# Patient Record
Sex: MALE | Race: WHITE | NOT HISPANIC OR LATINO | Employment: FULL TIME | URBAN - METROPOLITAN AREA
[De-identification: names, ages, dates, MRNs, and addresses within clinical notes are randomized per-mention and may not be internally consistent; named-entity substitution may affect disease eponyms.]

---

## 2020-02-25 ENCOUNTER — HOSPITAL ENCOUNTER (EMERGENCY)
Facility: HOSPITAL | Age: 30
Discharge: HOME | End: 2020-02-25
Attending: EMERGENCY MEDICINE | Admitting: EMERGENCY MEDICINE

## 2020-02-25 VITALS
OXYGEN SATURATION: 96 % | TEMPERATURE: 97.8 F | HEART RATE: 91 BPM | RESPIRATION RATE: 12 BRPM | SYSTOLIC BLOOD PRESSURE: 145 MMHG | WEIGHT: 158 LBS | HEIGHT: 71 IN | DIASTOLIC BLOOD PRESSURE: 88 MMHG | BODY MASS INDEX: 22.12 KG/M2

## 2020-02-25 DIAGNOSIS — S39.012A STRAIN OF LUMBAR REGION, INITIAL ENCOUNTER: Primary | ICD-10-CM

## 2020-02-25 DIAGNOSIS — M62.830 LUMBAR PARASPINAL MUSCLE SPASM: ICD-10-CM

## 2020-02-25 PROCEDURE — 25010000002 KETOROLAC TROMETHAMINE PER 15 MG: Performed by: PHYSICIAN ASSISTANT

## 2020-02-25 PROCEDURE — 96372 THER/PROPH/DIAG INJ SC/IM: CPT

## 2020-02-25 PROCEDURE — 99282 EMERGENCY DEPT VISIT SF MDM: CPT

## 2020-02-25 PROCEDURE — 99282 EMERGENCY DEPT VISIT SF MDM: CPT | Performed by: PHYSICIAN ASSISTANT

## 2020-02-25 RX ORDER — KETOROLAC TROMETHAMINE 30 MG/ML
60 INJECTION, SOLUTION INTRAMUSCULAR; INTRAVENOUS ONCE
Status: COMPLETED | OUTPATIENT
Start: 2020-02-25 | End: 2020-02-25

## 2020-02-25 RX ORDER — IBUPROFEN 400 MG/1
400 TABLET ORAL EVERY 6 HOURS PRN
COMMUNITY
End: 2022-07-18

## 2020-02-25 RX ORDER — METHOCARBAMOL 750 MG/1
750 TABLET, FILM COATED ORAL 3 TIMES DAILY PRN
Qty: 21 TABLET | Refills: 0 | Status: SHIPPED | OUTPATIENT
Start: 2020-02-25 | End: 2022-07-12

## 2020-02-25 RX ADMIN — KETOROLAC TROMETHAMINE 60 MG: 30 INJECTION, SOLUTION INTRAMUSCULAR; INTRAVENOUS at 15:06

## 2020-02-25 NOTE — ED PROVIDER NOTES
EMERGENCY DEPARTMENT ENCOUNTER      Room Number: 2/02    History is provided by the patient, no translation services needed    HPI:    Chief complaint: Back pain    Location: Right lumbar region    Quality/Severity: 7/10, tightness    Timing/Duration: Patient was lifting boxes of hardwood mango yesterday when his pain began.    Modifying Factors: Patient states he took an ibuprofen last night and this morning, he states his pain does improve with ibuprofen.  Has not applied any ice or heat to the area.    Associated Symptoms: Positive for back pain.  Patient denies any pain radiating down his leg, saddle anesthesia, loss of bowel or bladder control, history of IV drug use, or fevers or chills.    Narrative: Pt is a 30 y.o. male who presents complaining of right lower back pain since yesterday.  Patient states he was helping his dad insert mango, he was repeatedly lifting boxes of hardwood mango when he went to lift 1 and felt a sudden pain in his right lower back.  He states the pain radiates up to his thoracic region but denies any pain radiating down his leg.  He states the pain is worse with movement, and ibuprofen has helped.       PMD: Provider, No Known    REVIEW OF SYSTEMS  Review of Systems   Constitutional: Negative for chills and fever.   Respiratory: Negative for cough and shortness of breath.    Cardiovascular: Negative for chest pain and palpitations.   Gastrointestinal: Negative for abdominal pain, nausea and vomiting.   Genitourinary: Negative for difficulty urinating, dysuria, flank pain and hematuria.   Musculoskeletal: Positive for back pain. Negative for neck pain.   Skin: Negative for rash and wound.   Neurological: Negative for dizziness and syncope.   Psychiatric/Behavioral: Negative for confusion. The patient is not nervous/anxious.          PAST MEDICAL HISTORY  Active Ambulatory Problems     Diagnosis Date Noted   • No Active Ambulatory Problems     Resolved Ambulatory Problems      Diagnosis Date Noted   • No Resolved Ambulatory Problems     No Additional Past Medical History       PAST SURGICAL HISTORY  History reviewed. No pertinent surgical history.    FAMILY HISTORY  History reviewed. No pertinent family history.    SOCIAL HISTORY  Social History     Socioeconomic History   • Marital status: Single     Spouse name: Not on file   • Number of children: Not on file   • Years of education: Not on file   • Highest education level: Not on file   Tobacco Use   • Smoking status: Current Every Day Smoker     Packs/day: 0.50     Types: Cigarettes   Substance and Sexual Activity   • Alcohol use: Never     Frequency: Never       ALLERGIES  Patient has no known allergies.    No current facility-administered medications for this encounter.     Current Outpatient Medications:   •  ibuprofen (ADVIL,MOTRIN) 400 MG tablet, Take 400 mg by mouth Every 6 (Six) Hours As Needed for Mild Pain ., Disp: , Rfl:   •  methocarbamol (ROBAXIN) 750 MG tablet, Take 1 tablet by mouth 3 (Three) Times a Day As Needed for Muscle Spasms., Disp: 21 tablet, Rfl: 0    PHYSICAL EXAM  ED Triage Vitals   Temp Pulse Resp BP SpO2   -- -- -- -- --      Temp src Heart Rate Source Patient Position BP Location FiO2 (%)   -- -- -- -- --       Physical Exam   Constitutional: He is oriented to person, place, and time and well-developed, well-nourished, and in no distress.   HENT:   Head: Normocephalic and atraumatic.   Mouth/Throat: Oropharynx is clear and moist.   Neck: Normal range of motion. Neck supple.   Cardiovascular: Normal rate, regular rhythm and intact distal pulses.   Pulmonary/Chest: Effort normal. No respiratory distress.   Abdominal: There is no tenderness. There is no guarding.   Musculoskeletal:        Lumbar back: He exhibits tenderness (Right paraspinal muscle) and spasm. He exhibits no bony tenderness.   Negative straight leg raise.  Normal sensation, distal pulses equal and intact bilaterally.   Neurological: He is alert  and oriented to person, place, and time. Gait normal. GCS score is 15.   Skin: Skin is warm and dry.   Psychiatric: Mood, memory, affect and judgment normal.         LAB RESULTS  No results found for this or any previous visit.      I ordered the above labs and reviewed the results    RADIOLOGY  No results found.    I ordered the above radiologic testing and reviewed the results    PROCEDURES  Procedures      PROGRESS AND CONSULTS  ED Course as of Feb 25 1525   Tue Feb 25, 2020   1517 Patient presented with right-sided lumbar tenderness and spasm.  I discussed that he has likely strained a muscle in his lower back, he has no tenderness along his L-spine, pain is nonradiating.  He was given 60 mg of Toradol here, and a prescription for Robaxin.  I discussed that he may continue taking NSAIDs along with the Robaxin, and alternate ice and heat.  He has also been given instructions on proper lifting, and has been instructed to follow-up with a primary care doctor should he not improve.  I discussed if he has any worsening or emergent symptoms to return to the ED.  Patient verbalizes understanding and is agreeable with this plan.    [KS]      ED Course User Index  [KS] Tiara Peterson, NADYA           MEDICAL DECISION MAKING  Results were reviewed/discussed with the patient and they were also made aware of online access. Pt also made aware that some labs, such as cultures, will not be resulted during ER visit and follow up with PMD is necessary.     MDM        My differential diagnosis for back pain includes but is not limited to:  Musculoskeletal strain, contusion, retroperitoneal hematoma, disc protrusion, vertebral fracture, transverse process fracture, rib fracture, facet syndrome, sacroiliac joint strain, sciatica, renal injury, splenic injury, pancreatic injury, osteoarthritis, lumbar spondylosis, spinal stenosis, ankylosing spondylitis, sacroiliac joint inflammation, pancreatitis, perforated peptic ulcer,  diverticulitis, endometriosis, chronic PID, epidural abscess, osteomyelitis, retroperitoneal abscess, pyelonephritis, pneumonia, subphrenic abscess, tuberculosis, neurofibroma, meningioma, multiple myeloma, lymphoma, metastatic cancer, primary cancer, AAA, aortic dissection, spinal ischemia, referred pain, ureterolithiasis      DIAGNOSIS  Final diagnoses:   Strain of lumbar region, initial encounter   Lumbar paraspinal muscle spasm       Latest Documented Vital Signs:  As of 3:25 PM  BP- 145/88 HR- 91 Temp- 97.8 °F (36.6 °C) (Oral) O2 sat- 96%    DISPOSITION  Patient discharged home with instructions to follow-up with a PCP if not improved.    Patient/Family voiced understanding of need to follow-up for recheck, further testing as needed.  Return to the emergency Department warnings were given.         Medication List      New Prescriptions    methocarbamol 750 MG tablet  Commonly known as:  ROBAXIN  Take 1 tablet by mouth 3 (Three) Times a Day As Needed for Muscle Spasms.              Follow-up Information     Provider, No Known. Call in 1 week.    Why:  If not improved  Contact information:  [place] [id number] [phone]                     Dictated utilizing Dragon dictation       Tiara Peterson PA-C  02/25/20 1525       Tiara Peterson PA-C  02/25/20 1522

## 2020-02-25 NOTE — DISCHARGE INSTRUCTIONS
Return to the emergency department with worsening symptoms, or as needed with emergent concerns.    Continue taking Motrin 600 mg to 800 mg every 8 hours as needed for pain.

## 2021-11-29 ENCOUNTER — HOSPITAL ENCOUNTER (EMERGENCY)
Facility: HOSPITAL | Age: 31
Discharge: HOME | End: 2021-11-29
Attending: EMERGENCY MEDICINE | Admitting: EMERGENCY MEDICINE

## 2021-11-29 ENCOUNTER — APPOINTMENT (OUTPATIENT)
Dept: GENERAL RADIOLOGY | Facility: HOSPITAL | Age: 31
End: 2021-11-29

## 2021-11-29 VITALS
BODY MASS INDEX: 24.92 KG/M2 | RESPIRATION RATE: 16 BRPM | HEART RATE: 75 BPM | TEMPERATURE: 98.8 F | WEIGHT: 178 LBS | OXYGEN SATURATION: 98 % | SYSTOLIC BLOOD PRESSURE: 151 MMHG | HEIGHT: 71 IN | DIASTOLIC BLOOD PRESSURE: 85 MMHG

## 2021-11-29 DIAGNOSIS — S69.92XA FINGER INJURY, LEFT, INITIAL ENCOUNTER: Primary | ICD-10-CM

## 2021-11-29 PROCEDURE — 99283 EMERGENCY DEPT VISIT LOW MDM: CPT | Performed by: PHYSICIAN ASSISTANT

## 2021-11-29 PROCEDURE — 99282 EMERGENCY DEPT VISIT SF MDM: CPT

## 2021-11-29 PROCEDURE — 99283 EMERGENCY DEPT VISIT LOW MDM: CPT

## 2021-11-29 PROCEDURE — 73140 X-RAY EXAM OF FINGER(S): CPT

## 2021-11-29 RX ORDER — CEFADROXIL 500 MG/1
500 CAPSULE ORAL 2 TIMES DAILY
Qty: 10 CAPSULE | Refills: 0 | Status: SHIPPED | OUTPATIENT
Start: 2021-11-29 | End: 2021-12-04

## 2022-01-28 ENCOUNTER — HOSPITAL ENCOUNTER (EMERGENCY)
Facility: HOSPITAL | Age: 32
Discharge: HOME | End: 2022-01-28
Attending: EMERGENCY MEDICINE | Admitting: EMERGENCY MEDICINE

## 2022-01-28 VITALS
BODY MASS INDEX: 24.5 KG/M2 | TEMPERATURE: 98.1 F | HEART RATE: 88 BPM | RESPIRATION RATE: 18 BRPM | SYSTOLIC BLOOD PRESSURE: 125 MMHG | WEIGHT: 175 LBS | OXYGEN SATURATION: 98 % | DIASTOLIC BLOOD PRESSURE: 77 MMHG | HEIGHT: 71 IN

## 2022-01-28 DIAGNOSIS — L02.416 ABSCESS OF LEFT KNEE: Primary | ICD-10-CM

## 2022-01-28 DIAGNOSIS — L03.116 CELLULITIS OF LEFT LOWER EXTREMITY: ICD-10-CM

## 2022-01-28 PROCEDURE — 87186 SC STD MICRODIL/AGAR DIL: CPT | Performed by: NURSE PRACTITIONER

## 2022-01-28 PROCEDURE — 87070 CULTURE OTHR SPECIMN AEROBIC: CPT | Performed by: NURSE PRACTITIONER

## 2022-01-28 PROCEDURE — 99283 EMERGENCY DEPT VISIT LOW MDM: CPT

## 2022-01-28 PROCEDURE — 87147 CULTURE TYPE IMMUNOLOGIC: CPT | Performed by: NURSE PRACTITIONER

## 2022-01-28 PROCEDURE — 87205 SMEAR GRAM STAIN: CPT | Performed by: NURSE PRACTITIONER

## 2022-01-28 PROCEDURE — 10060 I&D ABSCESS SIMPLE/SINGLE: CPT | Performed by: NURSE PRACTITIONER

## 2022-01-28 RX ORDER — LIDOCAINE HYDROCHLORIDE 20 MG/ML
10 INJECTION, SOLUTION INFILTRATION; PERINEURAL ONCE
Status: COMPLETED | OUTPATIENT
Start: 2022-01-28 | End: 2022-01-28

## 2022-01-28 RX ORDER — BACITRACIN ZINC 500 [USP'U]/G
1 OINTMENT TOPICAL ONCE
Status: COMPLETED | OUTPATIENT
Start: 2022-01-28 | End: 2022-01-28

## 2022-01-28 RX ORDER — SULFAMETHOXAZOLE AND TRIMETHOPRIM 800; 160 MG/1; MG/1
1 TABLET ORAL 2 TIMES DAILY
Qty: 14 TABLET | Refills: 0 | Status: SHIPPED | OUTPATIENT
Start: 2022-01-28 | End: 2022-02-04

## 2022-01-28 RX ORDER — CEPHALEXIN 500 MG/1
500 CAPSULE ORAL 4 TIMES DAILY
Qty: 28 CAPSULE | Refills: 0 | Status: SHIPPED | OUTPATIENT
Start: 2022-01-28 | End: 2022-02-04

## 2022-01-28 RX ORDER — LIDOCAINE HYDROCHLORIDE AND EPINEPHRINE 10; 10 MG/ML; UG/ML
10 INJECTION, SOLUTION INFILTRATION; PERINEURAL ONCE
Status: DISCONTINUED | OUTPATIENT
Start: 2022-01-28 | End: 2022-01-28

## 2022-01-28 RX ADMIN — LIDOCAINE HYDROCHLORIDE 10 ML: 20 INJECTION, SOLUTION INFILTRATION; PERINEURAL at 12:57

## 2022-01-28 RX ADMIN — BACITRACIN ZINC 1 APPLICATION: 500 OINTMENT TOPICAL at 13:12

## 2022-01-30 LAB
BACTERIA SPEC AEROBE CULT: ABNORMAL
GRAM STN SPEC: ABNORMAL
GRAM STN SPEC: ABNORMAL

## 2022-07-06 ENCOUNTER — HOSPITAL ENCOUNTER (EMERGENCY)
Facility: HOSPITAL | Age: 32
Discharge: HOME | End: 2022-07-06
Attending: EMERGENCY MEDICINE | Admitting: EMERGENCY MEDICINE

## 2022-07-06 VITALS
WEIGHT: 172 LBS | OXYGEN SATURATION: 97 % | TEMPERATURE: 98.2 F | SYSTOLIC BLOOD PRESSURE: 139 MMHG | HEART RATE: 84 BPM | DIASTOLIC BLOOD PRESSURE: 83 MMHG | BODY MASS INDEX: 24.08 KG/M2 | RESPIRATION RATE: 16 BRPM | HEIGHT: 71 IN

## 2022-07-06 DIAGNOSIS — M70.41 PREPATELLAR BURSITIS OF RIGHT KNEE: Primary | ICD-10-CM

## 2022-07-06 PROCEDURE — 99283 EMERGENCY DEPT VISIT LOW MDM: CPT

## 2022-07-06 RX ORDER — SULFAMETHOXAZOLE AND TRIMETHOPRIM 800; 160 MG/1; MG/1
1 TABLET ORAL 2 TIMES DAILY
Qty: 14 TABLET | Refills: 0 | Status: SHIPPED | OUTPATIENT
Start: 2022-07-06 | End: 2022-07-12 | Stop reason: SDUPTHER

## 2022-07-06 RX ORDER — CEPHALEXIN 500 MG/1
500 CAPSULE ORAL ONCE
Status: COMPLETED | OUTPATIENT
Start: 2022-07-06 | End: 2022-07-06

## 2022-07-06 RX ORDER — SULFAMETHOXAZOLE AND TRIMETHOPRIM 800; 160 MG/1; MG/1
1 TABLET ORAL ONCE
Status: COMPLETED | OUTPATIENT
Start: 2022-07-06 | End: 2022-07-06

## 2022-07-06 RX ORDER — CEPHALEXIN 500 MG/1
500 CAPSULE ORAL 4 TIMES DAILY
Qty: 28 CAPSULE | Refills: 0 | Status: SHIPPED | OUTPATIENT
Start: 2022-07-06 | End: 2022-07-12 | Stop reason: SDUPTHER

## 2022-07-06 RX ADMIN — CEPHALEXIN 500 MG: 500 CAPSULE ORAL at 00:50

## 2022-07-06 RX ADMIN — SULFAMETHOXAZOLE AND TRIMETHOPRIM 1 TABLET: 800; 160 TABLET ORAL at 00:50

## 2022-07-06 NOTE — ED PROVIDER NOTES
EMERGENCY DEPARTMENT ENCOUNTER    Room Number:  08/08  Date seen:  7/6/2022  PCP: Provider, No Known  Historian: Patient      HPI:  Chief Complaint: Right knee swelling, tenderness  A complete HPI/ROS/PMH/PSH/SH/FH are unobtainable due to: N/A  Context: Jonny Arellano is a 32 y.o. male who presents to the ED c/o increasing swelling and tenderness in the right knee.  Patient says that 3 weeks ago he was evaluated at an urgent care center for an injury to his right knee.  Over the 3 weeks time, he has had some intermittent swelling and tenderness but recently over the past week it seems to be more swollen and now some redness has developed over the knee and slightly spreading into the lower leg as well.  Patient denies any fevers.  He works for a m[employer] company and he his usual job activities require him to frequently put pressure on his knees.            PAST MEDICAL HISTORY  Active Ambulatory Problems     Diagnosis Date Noted   • No Active Ambulatory Problems     Resolved Ambulatory Problems     Diagnosis Date Noted   • No Resolved Ambulatory Problems     No Additional Past Medical History         PAST SURGICAL HISTORY  History reviewed. No pertinent surgical history.      FAMILY HISTORY  History reviewed. No pertinent family history.      SOCIAL HISTORY  Social History     Socioeconomic History   • Marital status: Single   Tobacco Use   • Smoking status: Current Every Day Smoker     Packs/day: 0.50     Types: Cigarettes   Substance and Sexual Activity   • Alcohol use: Never         ALLERGIES  Patient has no known allergies.        REVIEW OF SYSTEMS  Review of Systems   Constitutional: Negative for activity change and fever.   HENT: Negative.    Respiratory: Negative for cough and shortness of breath.    Cardiovascular: Negative for chest pain.   Gastrointestinal: Negative for abdominal pain and vomiting.   Musculoskeletal: Positive for arthralgias and joint swelling. Negative for gait problem.   Skin: Negative  for pallor and wound.   Neurological: Negative for syncope and headaches.   All other systems reviewed and are negative.           PHYSICAL EXAM  ED Triage Vitals [07/06/22 0025]   Temp Heart Rate Resp BP SpO2   98.2 °F (36.8 °C) 84 16 139/83 97 %      Temp src Heart Rate Source Patient Position BP Location FiO2 (%)   Oral Monitor Sitting Right arm --         GENERAL: Pleasant young man, normal habitus, calm and no acute distress  HENT: nares patent, normocephalic and atraumatic  EYES: no scleral icterus, EOMI  CV: regular rhythm, normal rate, normal distal pulses at the bilateral lower legs  RESPIRATORY: normal effort, no stridor  MUSCULOSKELETAL: The right knee shows features that are common for prepatellar bursitis with some soft tissue swelling anterior to the knee that is mildly tender and erythematous.  There is a very small area of erythema extending beyond the anterior knee and towards the proximal tibia soft tissues with slight induration.  The knee is warm to touch but not hot to touch.  Patient is able to flex and extend the knee appropriately.  He is able to bear weight and ambulate on the knee unassisted without any significant sign of discomfort.  NEURO: alert, moves all extremities, follows commands, no deficits  PSYCH:  calm, cooperative  SKIN: warm, dry    Vital signs and nursing notes reviewed.          LAB RESULTS  No results found for this or any previous visit (from the past 24 hour(s)).    Ordered the above labs and reviewed the results.        RADIOLOGY  No Radiology Exams Resulted Within Past 24 Hours    Ordered the above noted radiological studies. Reviewed by me in PACS.            PROCEDURES  Procedures              MEDICATIONS GIVEN IN ER  Medications   cephalexin (KEFLEX) capsule 500 mg (500 mg Oral Given 7/6/22 0050)   sulfamethoxazole-trimethoprim (BACTRIM DS,SEPTRA DS) 800-160 MG per tablet 1 tablet (1 tablet Oral Given 7/6/22 0050)                   MEDICAL DECISION MAKING, PROGRESS,  "and CONSULTS    All labs have been independently reviewed by me.  All radiology studies have been reviewed by me and discussed with radiologist dictating the report.   EKG's independently viewed and interpreted by me.  Discussion below represents my analysis of pertinent findings related to patient's condition, differential diagnosis, treatment plan and final disposition.          ED Course as of 07/06/22 0520 Wed Jul 06, 2022 0519 Patient presented with some nonspecific swelling and redness of the right knee.  History and examination were strongly suggestive of prepatellar bursitis especially since the patient is in the [employer] business.  I did not have any clinical concerns for a septic arthritis based on the appearance of the knee at this time.  I do worry that it had some early surrounding cellulitic changes and therefore I put him on dual antibiotic therapy with Keflex and Bactrim.  I encouraged him to follow-up promptly in the orthopedics office this week for further evaluation and management as needed.  Gave him resource information to do so.  Discussed with him the usual \"return to ER\" instructions for any worsening signs or symptoms prior to discharge as well. [TAMARA]      ED Course User Index  [TAMARA] Andrea Myers MD                  DIAGNOSIS  Final diagnoses:   Prepatellar bursitis of right knee         DISPOSITION  Home            Latest Documented Vital Signs:  As of 05:20 EDT  BP- 139/83 HR- 84 Temp- 98.2 °F (36.8 °C) (Oral) O2 sat- 97%        --    Please note that portions of this were completed with a voice recognition program.          Andrea Myers MD  07/06/22 0520    "

## 2022-07-06 NOTE — ED NOTES
PT presents to the ED with c/o left knee pain. Per pt he had an injury 3 weeks ago and eas seen at urgent care. Pt reports the swelling has went down but over the past week his knee has became increasingly red and warm to the touch. VSS.

## 2022-07-06 NOTE — DISCHARGE INSTRUCTIONS
Take antibiotics as directed.  Must call the orthopedics office in the morning to schedule an appointment for follow-up evaluation this week.  Please return to the emergency room for any worsening pain, swelling, redness, fevers, weakness or any other concerns.

## 2022-07-08 ENCOUNTER — PATIENT ROUNDING (BHMG ONLY) (OUTPATIENT)
Dept: ORTHOPEDIC SURGERY | Facility: CLINIC | Age: 32
End: 2022-07-08

## 2022-07-08 ENCOUNTER — OFFICE VISIT (OUTPATIENT)
Dept: ORTHOPEDIC SURGERY | Facility: CLINIC | Age: 32
End: 2022-07-08

## 2022-07-08 VITALS
HEIGHT: 71 IN | BODY MASS INDEX: 24.08 KG/M2 | WEIGHT: 172 LBS | DIASTOLIC BLOOD PRESSURE: 80 MMHG | HEART RATE: 83 BPM | SYSTOLIC BLOOD PRESSURE: 116 MMHG

## 2022-07-08 DIAGNOSIS — M71.161 INFECTION OF RIGHT PREPATELLAR BURSA: ICD-10-CM

## 2022-07-08 DIAGNOSIS — M25.561 ACUTE PAIN OF RIGHT KNEE: Primary | ICD-10-CM

## 2022-07-08 PROCEDURE — 99203 OFFICE O/P NEW LOW 30 MIN: CPT | Performed by: ORTHOPAEDIC SURGERY

## 2022-07-08 PROCEDURE — 73562 X-RAY EXAM OF KNEE 3: CPT | Performed by: ORTHOPAEDIC SURGERY

## 2022-07-08 RX ORDER — OMEPRAZOLE 20 MG/1
20 CAPSULE, DELAYED RELEASE ORAL DAILY
COMMUNITY

## 2022-07-08 NOTE — PROGRESS NOTES
Subjective: Infected right prepatellar bursa     Patient ID: Jonny Arellano is a 32 y.o. male.    Chief Complaint:    History of Present Illness 32-year-old male presents evaluation of his right knee which been symptomatic for several weeks no history of trauma but is noted some swelling over his right knee.  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  Developed some swelling first went to urgent care and they x-rayed him no abnormality placed him on an anti-inflammatory discharge but he later went to urgent care because he developing some swelling and erythema about the knee and they placed him on antibiotics Keflex and Bactrim.  Initially states in the past week he had significant swelling and erythema and even peeling of the skin and states it is better since his been on the antibiotic for the past 3 days.  Still having pain as expected       Social History     Occupational History   • Not on file   Tobacco Use   • Smoking status: Current Every Day Smoker     Packs/day: 0.50     Years: 8.00     Pack years: 4.00     Types: Cigarettes   • Smokeless tobacco: Never Used   Vaping Use   • Vaping Use: Never used   Substance and Sexual Activity   • Alcohol use: Never   • Drug use: Defer   • Sexual activity: Yes      Review of Systems      History reviewed. No pertinent past medical history.  History reviewed. No pertinent surgical history.  History reviewed. No pertinent family history.      Objective:  Vitals:    07/08/22 0847   BP: 116/80   Pulse: 83         07/08/22  0847   Weight: 78 kg (172 lb)     Body mass index is 23.99 kg/m².        Ortho Exam   AP lateral sunrise view of the knee does show the prepatellar bursal swelling but no acute arthritic conditions of the knee.  Reviewed the x-rays done on 3 Kika again shows prepatellar swelling.  He is alert and oriented x3.  Has normocephalic clear.  Patient does have moderate prepatellar swelling with erythema with peeling of the skin.  Erythema also  along the lateral aspect of the knee.  His calf remains nontender.  He has 0 x 90 degrees of motion.  There is pain as expected.  There is no drainage at this time.  Is good distal pulses.  Again he is taking Keflex 4 times a day 500 mg and Bactrim 800 mg twice a day    Assessment:  XR Knee 3 Vws RT  Order: [id number]    Narrative    REVIEWING YOUR TEST RESULTS IN [facility] IS NOT A SUBSTITUTE FOR DISCUSSING THOSE RESULTS WITH YOUR HEALTH CARE PROVIDER.   PLEASE CONTACT YOUR PROVIDER VIA [facility] TO DISCUSS ANY QUESTIONS OR CONCERNS YOU MAY HAVE REGARDING THESE TEST RESULTS.     RADIOLOGY REPORT     FACILITY:  [facility]   UNIT/AGE/GENDER: LENARDICCLG  OP      AGE:32 Y          SEX:M   PATIENT NAME/:  YONYN MONTANO G    1990   UNIT NUMBER:  [id number]   ACCOUNT NUMBER:  [id number]   ACCESSION NUMBER:  [id number]     Exam: 3 views right knee.     DATE:  2022     HISTORY: Right knee pain and swelling after injury     COMPARISON: None.     FINDINGS: There is prominent prepatellar soft tissue swelling. There is no fracture or dislocation. The lateral view is somewhat obliqued which limits sensitivity for a joint effusion.     IMPRESSION: Prominent prepatellar soft tissue swelling without underlying fracture. Limited examination for evaluation of a joint effusion.     Dictated by: Carlos El M.D.     Images and Report reviewed and interpreted by: Carlos El M.D.           1. Acute pain of left knee    2. Infection of right prepatellar bursa           Plan: Reviewed the x-rays taken physical exam with the patient.  He does have an infected prepatellar bursa that appears to be responding to the antibiotics.  I did tell him however that most of the time at this stage will require surgical excision but he is improving so we will have him continue the antibiotics and he does have enough to last until I see him next Tuesday.  And get a put in a knee immobilizer to wear when  ambulating he needs to be off work.  Does not need to wear the brace to bed.  He put a soft dressing over the bursa itself.  Return next Tuesday for reevaluation and determination whether he can continue to try to treat this nonoperatively or whether he will need surgery.  Patient was in agreement.  Answered all questions            Work Status:    SUE query complete.    Orders:  Orders Placed This Encounter   Procedures   • XR Knee 3 View Right       Medications:  No orders of the defined types were placed in this encounter.      Followup:  Return in about 4 days (around 7/12/2022).          Dictated utilizing Dragon dictation

## 2022-07-08 NOTE — PROGRESS NOTES
July 8, 2022    Hello, may I speak with Jonny Arellano?    My name is jorge      I am  with [facility]  [address]  [facility] [address] [phone].    Before we get started may I verify your date of birth? 1990    I am calling to officially welcome you to our practice and ask about your recent visit. Is this a good time to talk? yes    Tell me about your visit with us. What things went well? everything was great       We're always looking for ways to make our patients' experiences even better. Do you have recommendations on ways we may improve?  no    Overall were you satisfied with your first visit to our practice? yes       I appreciate you taking the time to speak with me today. Is there anything else I can do for you? no      Thank you, and have a great day.

## 2022-07-12 ENCOUNTER — ANESTHESIA EVENT (OUTPATIENT)
Dept: PERIOP | Facility: HOSPITAL | Age: 32
End: 2022-07-12

## 2022-07-12 ENCOUNTER — HOSPITAL ENCOUNTER (OUTPATIENT)
Dept: GENERAL RADIOLOGY | Facility: HOSPITAL | Age: 32
Discharge: HOME | End: 2022-07-12

## 2022-07-12 ENCOUNTER — OFFICE VISIT (OUTPATIENT)
Dept: ORTHOPEDIC SURGERY | Facility: CLINIC | Age: 32
End: 2022-07-12

## 2022-07-12 ENCOUNTER — PRE-ADMISSION TESTING (OUTPATIENT)
Dept: PREADMISSION TESTING | Facility: HOSPITAL | Age: 32
End: 2022-07-12

## 2022-07-12 ENCOUNTER — PREP FOR SURGERY (OUTPATIENT)
Dept: OTHER | Facility: HOSPITAL | Age: 32
End: 2022-07-12

## 2022-07-12 VITALS — WEIGHT: 172 LBS | HEIGHT: 71 IN | BODY MASS INDEX: 24.08 KG/M2

## 2022-07-12 VITALS
RESPIRATION RATE: 16 BRPM | BODY MASS INDEX: 24.9 KG/M2 | OXYGEN SATURATION: 97 % | HEART RATE: 73 BPM | HEIGHT: 71 IN | DIASTOLIC BLOOD PRESSURE: 69 MMHG | SYSTOLIC BLOOD PRESSURE: 125 MMHG | WEIGHT: 177.9 LBS

## 2022-07-12 DIAGNOSIS — M71.161 INFECTION OF RIGHT PREPATELLAR BURSA: ICD-10-CM

## 2022-07-12 DIAGNOSIS — M25.561 ACUTE PAIN OF RIGHT KNEE: Primary | ICD-10-CM

## 2022-07-12 DIAGNOSIS — M71.161 INFECTION OF RIGHT PREPATELLAR BURSA: Primary | ICD-10-CM

## 2022-07-12 LAB
ALBUMIN SERPL-MCNC: 4.6 G/DL (ref 3.5–5.2)
ALBUMIN/GLOB SERPL: 1.4 G/DL
ALP SERPL-CCNC: 80 U/L (ref 39–117)
ALT SERPL W P-5'-P-CCNC: 16 U/L (ref 1–41)
ANION GAP SERPL CALCULATED.3IONS-SCNC: 13.3 MMOL/L (ref 5–15)
AST SERPL-CCNC: 15 U/L (ref 1–40)
BASOPHILS # BLD AUTO: 0.04 10*3/MM3 (ref 0–0.2)
BASOPHILS NFR BLD AUTO: 0.4 % (ref 0–1.5)
BILIRUB SERPL-MCNC: <0.2 MG/DL (ref 0–1.2)
BUN SERPL-MCNC: 13 MG/DL (ref 6–20)
BUN/CREAT SERPL: 12 (ref 7–25)
CALCIUM SPEC-SCNC: 9.5 MG/DL (ref 8.6–10.5)
CHLORIDE SERPL-SCNC: 100 MMOL/L (ref 98–107)
CO2 SERPL-SCNC: 21.7 MMOL/L (ref 22–29)
CREAT SERPL-MCNC: 1.08 MG/DL (ref 0.76–1.27)
DEPRECATED RDW RBC AUTO: 43 FL (ref 37–54)
EGFRCR SERPLBLD CKD-EPI 2021: 92.3 ML/MIN/1.73
EOSINOPHIL # BLD AUTO: 0.28 10*3/MM3 (ref 0–0.4)
EOSINOPHIL NFR BLD AUTO: 3.1 % (ref 0.3–6.2)
ERYTHROCYTE [DISTWIDTH] IN BLOOD BY AUTOMATED COUNT: 13.2 % (ref 12.3–15.4)
GLOBULIN UR ELPH-MCNC: 3.4 GM/DL
GLUCOSE SERPL-MCNC: 105 MG/DL (ref 65–99)
HBA1C MFR BLD: 5.8 % (ref 4.8–5.6)
HCT VFR BLD AUTO: 39.6 % (ref 37.5–51)
HGB BLD-MCNC: 13.2 G/DL (ref 13–17.7)
IMM GRANULOCYTES # BLD AUTO: 0.02 10*3/MM3 (ref 0–0.05)
IMM GRANULOCYTES NFR BLD AUTO: 0.2 % (ref 0–0.5)
LYMPHOCYTES # BLD AUTO: 2.32 10*3/MM3 (ref 0.7–3.1)
LYMPHOCYTES NFR BLD AUTO: 25.6 % (ref 19.6–45.3)
MCH RBC QN AUTO: 29.5 PG (ref 26.6–33)
MCHC RBC AUTO-ENTMCNC: 33.3 G/DL (ref 31.5–35.7)
MCV RBC AUTO: 88.4 FL (ref 79–97)
MONOCYTES # BLD AUTO: 0.52 10*3/MM3 (ref 0.1–0.9)
MONOCYTES NFR BLD AUTO: 5.7 % (ref 5–12)
NEUTROPHILS NFR BLD AUTO: 5.88 10*3/MM3 (ref 1.7–7)
NEUTROPHILS NFR BLD AUTO: 65 % (ref 42.7–76)
NRBC BLD AUTO-RTO: 0 /100 WBC (ref 0–0.2)
PLATELET # BLD AUTO: 268 10*3/MM3 (ref 140–450)
PMV BLD AUTO: 8.6 FL (ref 6–12)
POTASSIUM SERPL-SCNC: 4.4 MMOL/L (ref 3.5–5.2)
PROT SERPL-MCNC: 8 G/DL (ref 6–8.5)
QT INTERVAL: 367 MS
RBC # BLD AUTO: 4.48 10*6/MM3 (ref 4.14–5.8)
SARS-COV-2 RNA PNL SPEC NAA+PROBE: NOT DETECTED
SODIUM SERPL-SCNC: 135 MMOL/L (ref 136–145)
WBC NRBC COR # BLD: 9.06 10*3/MM3 (ref 3.4–10.8)

## 2022-07-12 PROCEDURE — 71046 X-RAY EXAM CHEST 2 VIEWS: CPT

## 2022-07-12 PROCEDURE — 99213 OFFICE O/P EST LOW 20 MIN: CPT | Performed by: ORTHOPAEDIC SURGERY

## 2022-07-12 PROCEDURE — 36415 COLL VENOUS BLD VENIPUNCTURE: CPT

## 2022-07-12 PROCEDURE — 80053 COMPREHEN METABOLIC PANEL: CPT | Performed by: ORTHOPAEDIC SURGERY

## 2022-07-12 PROCEDURE — 85025 COMPLETE CBC W/AUTO DIFF WBC: CPT | Performed by: ORTHOPAEDIC SURGERY

## 2022-07-12 PROCEDURE — 93010 ELECTROCARDIOGRAM REPORT: CPT | Performed by: INTERNAL MEDICINE

## 2022-07-12 PROCEDURE — 93005 ELECTROCARDIOGRAM TRACING: CPT

## 2022-07-12 PROCEDURE — 87635 SARS-COV-2 COVID-19 AMP PRB: CPT | Performed by: ORTHOPAEDIC SURGERY

## 2022-07-12 PROCEDURE — 83036 HEMOGLOBIN GLYCOSYLATED A1C: CPT | Performed by: ORTHOPAEDIC SURGERY

## 2022-07-12 PROCEDURE — C9803 HOPD COVID-19 SPEC COLLECT: HCPCS

## 2022-07-12 RX ORDER — CEFAZOLIN SODIUM 2 G/50ML
2 SOLUTION INTRAVENOUS ONCE
Status: CANCELLED | OUTPATIENT
Start: 2022-07-15

## 2022-07-12 RX ORDER — PREGABALIN 75 MG/1
75 CAPSULE ORAL ONCE
Status: CANCELLED | OUTPATIENT
Start: 2022-07-15

## 2022-07-12 RX ORDER — SULFAMETHOXAZOLE AND TRIMETHOPRIM 800; 160 MG/1; MG/1
1 TABLET ORAL 2 TIMES DAILY
Qty: 14 TABLET | Refills: 0 | Status: SHIPPED | OUTPATIENT
Start: 2022-07-12 | End: 2022-07-15 | Stop reason: HOSPADM

## 2022-07-12 RX ORDER — NAPROXEN SODIUM 220 MG
440 TABLET ORAL 2 TIMES DAILY PRN
COMMUNITY
End: 2022-07-15 | Stop reason: HOSPADM

## 2022-07-12 RX ORDER — ACETAMINOPHEN 500 MG
1000 TABLET ORAL ONCE
Status: CANCELLED | OUTPATIENT
Start: 2022-07-15

## 2022-07-12 RX ORDER — CEPHALEXIN 500 MG/1
500 CAPSULE ORAL 4 TIMES DAILY
Qty: 28 CAPSULE | Refills: 0 | Status: SHIPPED | OUTPATIENT
Start: 2022-07-12 | End: 2022-07-15 | Stop reason: HOSPADM

## 2022-07-12 NOTE — PROGRESS NOTES
Subjective: Infected right prepatellar bursa     Patient ID: Jonny Arellano is a 32 y.o. male.    Chief Complaint:    History of Present Illness patient seen for follow-up.  Continues to have moderate swelling with erythema.  Some improvement but the bursa is still the same size.  Not experiencing significant pain.       Social History     Occupational History   • Not on file   Tobacco Use   • Smoking status: Current Every Day Smoker     Packs/day: 0.50     Years: 8.00     Pack years: 4.00     Types: Cigarettes   • Smokeless tobacco: Never Used   Vaping Use   • Vaping Use: Never used   Substance and Sexual Activity   • Alcohol use: Never   • Drug use: Defer   • Sexual activity: Yes      Review of Systems      History reviewed. No pertinent past medical history.  History reviewed. No pertinent surgical history.  History reviewed. No pertinent family history.      Objective:  There were no vitals filed for this visit.      07/12/22 0922   Weight: 78 kg (172 lb)     Body mass index is 23.99 kg/m².        Ortho Exam   He is alert and oriented x3.  Again the bursa still swollen still erythematous.  No drainage.  Calf is nontender    Assessment:        1. Acute pain of right knee    2. Infection of right prepatellar bursa           Plan: Reviewed the patient's symptoms and exam.  Discussed surgical excision which I believe at this point is the best way to manage this infected bursa he was in agreement.  Schedule him for Friday afternoon.  We will proceed as an outpatient.  Answered all questions.  He will return on July 21 for postop wound check            Work Status:    SUE query complete.    Orders:  No orders of the defined types were placed in this encounter.      Medications:  New Medications Ordered This Visit   Medications   • cephalexin (KEFLEX) 500 MG capsule     Sig: Take 1 capsule by mouth 4 (Four) Times a Day for 7 days.     Dispense:  28 capsule     Refill:  0   • sulfamethoxazole-trimethoprim (BACTRIM  DS,SEPTRA DS) 800-160 MG per tablet     Sig: Take 1 tablet by mouth 2 (Two) Times a Day for 7 days.     Dispense:  14 tablet     Refill:  0       Followup:  Return in about 9 days (around 7/21/2022).          Dictated utilizing Dragon dictation

## 2022-07-12 NOTE — DISCHARGE INSTRUCTIONS
PRE-ADMISSION TESTING INSTRUCTIONS FOR ADULTS    Take these medications the morning of surgery with a small sip of water:  nothing      Do not take any insulin or diabetes medications the morning of surgery.      No aspirin, advil, aleve, ibuprofen, naproxen, diet pills, decongestants, or herbal/vitamins for a week prior to surgery.    General Instructions:    DO NOT EAT SOLID FOOD AFTER MIDNIGHT THE NIGHT BEFORE SURGERY. No gum, mints, or hard candy after midnight the night before surgery.  You may drink clear liquids the day of surgery up until 2 hours before your arrival time.  (10:00 am)  Clear liquids are liquids you can see through. Nothing RED in color.    Plain water    Sports drinks  Sodas     Gelatin (Jell-O)  Fruit juices without pulp such as white grape juice and apple juice  Popsicles that contain no fruit or yogurt  Tea or coffee (no cream or milk added)    It is beneficial for you to have a clear drink that contains carbohydrates just before you leave your house and before your fasting time begins.  We suggest a 20 ounce bottle of Gatorade or Powerade for non-diabetic patients or a 20 ounce bottle of G2 or Powerade Zero for diabetic patients.  (10:00 am)    Patients who avoid smoking, chewing tobacco and alcohol for 4 weeks prior to surgery have a reduced risk of post-operative complications.  If at all possible, quit smoking as many days before surgery as you can.    Do not smoke, use chewing tobacco or drink alcohol the day of surgery    Bring your C-PAP/ BI-PAP machine if you use one.  Wear clean comfortable clothes and socks.  Do not wear contact lenses, lotion, deodorant, or make-up.  Bring a case for your glasses if applicable. You may brush your teeth the morning of surgery.  You may wear dentures/partials, do not put adhesive/glue on them.  Leave all other jewelry and valuables at home.      Preventing a Surgical Site Infection:    Shower the night before and on the morning of surgery using  the chlorhexidine soap you were given.  Use a clean washcloth with the soap.  Place clean sheets on your bed after showering the night before surgery. Do not use the CHG soap on your hair, face, or private areas. Wash your body gently for five (5) minutes. Do not scrub your skin.  Dry with a clean towel and dress in clean clothing.  Do not shave the surgical area for 10 days-2 weeks prior to surgery  because the razor can irritate skin and make it easier to develop an infection.  Make sure you, your family, and all healthcare providers clean their hands with soap and water or an alcohol based hand  before caring for you or your wound.      Day of surgery:    Your surgeon’s office will advise you of your arrival time for the day of surgery.    Upon arrival, a Pre-op nurse and Anesthesia provider will review your health history, obtain vital signs, and answer questions you may have.  The only belongings needed at this time will be your home medications and if applicable your C-PAP/BI-PAP machine.  If you are staying overnight your family can leave the rest of your belongings in the car and bring them to your room later.  A Pre-op nurse will start an IV and you may receive medication in preparation for surgery, including something to help you relax.  Your family will be able to see you in the Pre-op area.  While you are in surgery your family should notify the waiting room  if they leave the waiting room area and provide a contact phone number.    IF you have any questions, you can call the Pre-Admission Department at [phone] or your surgeon's office.  Notify your surgeon if  you become sick, have a fever, productive cough, or cannot be here the day of surgery    Please be aware that surgery does come with discomfort.  We want to make every effort to control your discomfort so please discuss any uncontrolled symptoms with your nurse.   Your doctor will most likely have prescribed pain  medications.      If you are going home after surgery, you will receive individualized written care instructions before being discharged.  A responsible adult (over the age of 18) must drive you to and from the hospital on the day of your surgery and stay with you for 24 hours after anesthesia.    If you are staying overnight following surgery, you will be transported to your hospital room following the recovery period.  [facility] has all private rooms.    You may receive a survey regarding the care you received. Your feedback is very important and will be used to collect the necessary data to help us to continue to provide excellent care.     Deductibles and co-payments are collected on the day of service. Please be prepared to pay the required co-pay, deductible or deposit on the day of service as defined by your plan.

## 2022-07-12 NOTE — PAT
Pt here for PAT visit.  Pre-op tests completed, chg soap given, and instructions reviewed.  Instructed clears until 10:00 am dos and no smoking after MN night prior, voiced understanding.

## 2022-07-15 ENCOUNTER — ANESTHESIA (OUTPATIENT)
Dept: PERIOP | Facility: HOSPITAL | Age: 32
End: 2022-07-15

## 2022-07-15 ENCOUNTER — HOSPITAL ENCOUNTER (OUTPATIENT)
Facility: HOSPITAL | Age: 32
Setting detail: HOSPITAL OUTPATIENT SURGERY
Discharge: HOME | End: 2022-07-15
Attending: ORTHOPAEDIC SURGERY | Admitting: ORTHOPAEDIC SURGERY

## 2022-07-15 VITALS
OXYGEN SATURATION: 99 % | WEIGHT: 173.8 LBS | RESPIRATION RATE: 15 BRPM | DIASTOLIC BLOOD PRESSURE: 68 MMHG | HEART RATE: 63 BPM | TEMPERATURE: 97.8 F | BODY MASS INDEX: 24.24 KG/M2 | SYSTOLIC BLOOD PRESSURE: 115 MMHG

## 2022-07-15 DIAGNOSIS — M71.161 INFECTION OF RIGHT PREPATELLAR BURSA: ICD-10-CM

## 2022-07-15 PROCEDURE — 0 CEFAZOLIN SODIUM-DEXTROSE 2-3 GM-%(50ML) RECONSTITUTED SOLUTION: Performed by: ORTHOPAEDIC SURGERY

## 2022-07-15 PROCEDURE — L1830 KO IMMOB CANVAS LONG PRE OTS: HCPCS | Performed by: ORTHOPAEDIC SURGERY

## 2022-07-15 PROCEDURE — 76942 ECHO GUIDE FOR BIOPSY: CPT | Performed by: ORTHOPAEDIC SURGERY

## 2022-07-15 PROCEDURE — 25010000002 DEXAMETHASONE PER 1 MG: Performed by: NURSE ANESTHETIST, CERTIFIED REGISTERED

## 2022-07-15 PROCEDURE — 25010000002 FENTANYL CITRATE (PF) 50 MCG/ML SOLUTION: Performed by: NURSE ANESTHETIST, CERTIFIED REGISTERED

## 2022-07-15 PROCEDURE — 27340 REMOVAL OF KNEECAP BURSA: CPT | Performed by: ORTHOPAEDIC SURGERY

## 2022-07-15 PROCEDURE — 87070 CULTURE OTHR SPECIMN AEROBIC: CPT | Performed by: ORTHOPAEDIC SURGERY

## 2022-07-15 PROCEDURE — 25010000002 ROPIVACAINE PER 1 MG: Performed by: NURSE ANESTHETIST, CERTIFIED REGISTERED

## 2022-07-15 PROCEDURE — 25010000002 VANCOMYCIN HCL 1.25 G RECONSTITUTED SOLUTION 1 EACH VIAL: Performed by: ORTHOPAEDIC SURGERY

## 2022-07-15 PROCEDURE — 25010000002 KETOROLAC TROMETHAMINE PER 15 MG: Performed by: NURSE ANESTHETIST, CERTIFIED REGISTERED

## 2022-07-15 PROCEDURE — 25010000002 VANCOMYCIN PER 500 MG: Performed by: ORTHOPAEDIC SURGERY

## 2022-07-15 PROCEDURE — 87176 TISSUE HOMOGENIZATION CULTR: CPT | Performed by: ORTHOPAEDIC SURGERY

## 2022-07-15 PROCEDURE — 87205 SMEAR GRAM STAIN: CPT | Performed by: ORTHOPAEDIC SURGERY

## 2022-07-15 PROCEDURE — 25010000002 ONDANSETRON PER 1 MG: Performed by: NURSE ANESTHETIST, CERTIFIED REGISTERED

## 2022-07-15 PROCEDURE — 25010000002 MIDAZOLAM PER 1MG: Performed by: NURSE ANESTHETIST, CERTIFIED REGISTERED

## 2022-07-15 PROCEDURE — S0260 H&P FOR SURGERY: HCPCS | Performed by: ORTHOPAEDIC SURGERY

## 2022-07-15 PROCEDURE — 25010000002 PROPOFOL 10 MG/ML EMULSION: Performed by: NURSE ANESTHETIST, CERTIFIED REGISTERED

## 2022-07-15 RX ORDER — CEPHALEXIN 500 MG/1
500 CAPSULE ORAL 4 TIMES DAILY
Qty: 40 CAPSULE | Refills: 0 | Status: SHIPPED | OUTPATIENT
Start: 2022-07-15 | End: 2022-07-25

## 2022-07-15 RX ORDER — ONDANSETRON 2 MG/ML
4 INJECTION INTRAMUSCULAR; INTRAVENOUS ONCE AS NEEDED
Status: DISCONTINUED | OUTPATIENT
Start: 2022-07-15 | End: 2022-07-15 | Stop reason: HOSPADM

## 2022-07-15 RX ORDER — FENTANYL CITRATE 50 UG/ML
INJECTION, SOLUTION INTRAMUSCULAR; INTRAVENOUS AS NEEDED
Status: DISCONTINUED | OUTPATIENT
Start: 2022-07-15 | End: 2022-07-15 | Stop reason: SURG

## 2022-07-15 RX ORDER — DIAPER,BRIEF,INFANT-TODD,DISP
EACH MISCELLANEOUS AS NEEDED
Status: DISCONTINUED | OUTPATIENT
Start: 2022-07-15 | End: 2022-07-15 | Stop reason: HOSPADM

## 2022-07-15 RX ORDER — ACETAMINOPHEN 500 MG
1000 TABLET ORAL ONCE
Status: COMPLETED | OUTPATIENT
Start: 2022-07-15 | End: 2022-07-15

## 2022-07-15 RX ORDER — SULFAMETHOXAZOLE AND TRIMETHOPRIM 800; 160 MG/1; MG/1
1 TABLET ORAL 2 TIMES DAILY
Qty: 20 TABLET | Refills: 0 | Status: SHIPPED | OUTPATIENT
Start: 2022-07-15 | End: 2022-07-21

## 2022-07-15 RX ORDER — ROPIVACAINE HYDROCHLORIDE 2 MG/ML
INJECTION, SOLUTION EPIDURAL; INFILTRATION; PERINEURAL
Status: COMPLETED | OUTPATIENT
Start: 2022-07-15 | End: 2022-07-15

## 2022-07-15 RX ORDER — PROPOFOL 10 MG/ML
VIAL (ML) INTRAVENOUS AS NEEDED
Status: DISCONTINUED | OUTPATIENT
Start: 2022-07-15 | End: 2022-07-15 | Stop reason: SURG

## 2022-07-15 RX ORDER — DEXMEDETOMIDINE HYDROCHLORIDE 100 UG/ML
INJECTION, SOLUTION INTRAVENOUS AS NEEDED
Status: DISCONTINUED | OUTPATIENT
Start: 2022-07-15 | End: 2022-07-15 | Stop reason: SURG

## 2022-07-15 RX ORDER — VANCOMYCIN HYDROCHLORIDE 500 MG/10ML
INJECTION, POWDER, LYOPHILIZED, FOR SOLUTION INTRAVENOUS AS NEEDED
Status: DISCONTINUED | OUTPATIENT
Start: 2022-07-15 | End: 2022-07-15 | Stop reason: HOSPADM

## 2022-07-15 RX ORDER — MAGNESIUM HYDROXIDE 1200 MG/15ML
LIQUID ORAL AS NEEDED
Status: DISCONTINUED | OUTPATIENT
Start: 2022-07-15 | End: 2022-07-15 | Stop reason: HOSPADM

## 2022-07-15 RX ORDER — BUPIVACAINE HYDROCHLORIDE 2.5 MG/ML
INJECTION, SOLUTION EPIDURAL; INFILTRATION; INTRACAUDAL
Status: COMPLETED | OUTPATIENT
Start: 2022-07-15 | End: 2022-07-15

## 2022-07-15 RX ORDER — SODIUM CHLORIDE 0.9 % (FLUSH) 0.9 %
10 SYRINGE (ML) INJECTION AS NEEDED
Status: DISCONTINUED | OUTPATIENT
Start: 2022-07-15 | End: 2022-07-15 | Stop reason: HOSPADM

## 2022-07-15 RX ORDER — FENTANYL CITRATE 50 UG/ML
25 INJECTION, SOLUTION INTRAMUSCULAR; INTRAVENOUS
Status: DISCONTINUED | OUTPATIENT
Start: 2022-07-15 | End: 2022-07-15 | Stop reason: HOSPADM

## 2022-07-15 RX ORDER — OXYCODONE HYDROCHLORIDE AND ACETAMINOPHEN 5; 325 MG/1; MG/1
1 TABLET ORAL ONCE AS NEEDED
Status: DISCONTINUED | OUTPATIENT
Start: 2022-07-15 | End: 2022-07-15 | Stop reason: HOSPADM

## 2022-07-15 RX ORDER — MIDAZOLAM HYDROCHLORIDE 2 MG/2ML
0.5 INJECTION, SOLUTION INTRAMUSCULAR; INTRAVENOUS
Status: COMPLETED | OUTPATIENT
Start: 2022-07-15 | End: 2022-07-15

## 2022-07-15 RX ORDER — SODIUM CHLORIDE, SODIUM LACTATE, POTASSIUM CHLORIDE, CALCIUM CHLORIDE 600; 310; 30; 20 MG/100ML; MG/100ML; MG/100ML; MG/100ML
100 INJECTION, SOLUTION INTRAVENOUS CONTINUOUS
Status: DISCONTINUED | OUTPATIENT
Start: 2022-07-15 | End: 2022-07-15 | Stop reason: HOSPADM

## 2022-07-15 RX ORDER — LIDOCAINE HYDROCHLORIDE 10 MG/ML
0.5 INJECTION, SOLUTION EPIDURAL; INFILTRATION; INTRACAUDAL; PERINEURAL ONCE AS NEEDED
Status: DISCONTINUED | OUTPATIENT
Start: 2022-07-15 | End: 2022-07-15 | Stop reason: HOSPADM

## 2022-07-15 RX ORDER — DEXAMETHASONE SODIUM PHOSPHATE 4 MG/ML
8 INJECTION, SOLUTION INTRA-ARTICULAR; INTRALESIONAL; INTRAMUSCULAR; INTRAVENOUS; SOFT TISSUE ONCE AS NEEDED
Status: COMPLETED | OUTPATIENT
Start: 2022-07-15 | End: 2022-07-15

## 2022-07-15 RX ORDER — FAMOTIDINE 10 MG/ML
20 INJECTION, SOLUTION INTRAVENOUS
Status: COMPLETED | OUTPATIENT
Start: 2022-07-15 | End: 2022-07-15

## 2022-07-15 RX ORDER — SODIUM CHLORIDE, SODIUM LACTATE, POTASSIUM CHLORIDE, CALCIUM CHLORIDE 600; 310; 30; 20 MG/100ML; MG/100ML; MG/100ML; MG/100ML
9 INJECTION, SOLUTION INTRAVENOUS CONTINUOUS PRN
Status: DISCONTINUED | OUTPATIENT
Start: 2022-07-15 | End: 2022-07-15 | Stop reason: HOSPADM

## 2022-07-15 RX ORDER — KETOROLAC TROMETHAMINE 30 MG/ML
INJECTION, SOLUTION INTRAMUSCULAR; INTRAVENOUS AS NEEDED
Status: DISCONTINUED | OUTPATIENT
Start: 2022-07-15 | End: 2022-07-15 | Stop reason: SURG

## 2022-07-15 RX ORDER — SODIUM CHLORIDE 0.9 % (FLUSH) 0.9 %
10 SYRINGE (ML) INJECTION EVERY 12 HOURS SCHEDULED
Status: DISCONTINUED | OUTPATIENT
Start: 2022-07-15 | End: 2022-07-15 | Stop reason: HOSPADM

## 2022-07-15 RX ORDER — SODIUM CHLORIDE 9 MG/ML
40 INJECTION, SOLUTION INTRAVENOUS AS NEEDED
Status: DISCONTINUED | OUTPATIENT
Start: 2022-07-15 | End: 2022-07-15 | Stop reason: HOSPADM

## 2022-07-15 RX ORDER — ONDANSETRON 2 MG/ML
4 INJECTION INTRAMUSCULAR; INTRAVENOUS ONCE AS NEEDED
Status: COMPLETED | OUTPATIENT
Start: 2022-07-15 | End: 2022-07-15

## 2022-07-15 RX ORDER — PREGABALIN 75 MG/1
75 CAPSULE ORAL ONCE
Status: COMPLETED | OUTPATIENT
Start: 2022-07-15 | End: 2022-07-15

## 2022-07-15 RX ORDER — CEFAZOLIN SODIUM 2 G/50ML
2 SOLUTION INTRAVENOUS ONCE
Status: COMPLETED | OUTPATIENT
Start: 2022-07-15 | End: 2022-07-15

## 2022-07-15 RX ORDER — OXYCODONE AND ACETAMINOPHEN 7.5; 325 MG/1; MG/1
1 TABLET ORAL EVERY 4 HOURS PRN
Qty: 42 TABLET | Refills: 0 | Status: SHIPPED | OUTPATIENT
Start: 2022-07-15 | End: 2022-07-20 | Stop reason: SDUPTHER

## 2022-07-15 RX ADMIN — PREGABALIN 75 MG: 75 CAPSULE ORAL at 14:15

## 2022-07-15 RX ADMIN — DEXAMETHASONE SODIUM PHOSPHATE 8 MG: 4 INJECTION, SOLUTION INTRAMUSCULAR; INTRAVENOUS at 14:13

## 2022-07-15 RX ADMIN — CEFAZOLIN SODIUM 2 G: 2 SOLUTION INTRAVENOUS at 15:00

## 2022-07-15 RX ADMIN — ACETAMINOPHEN 1000 MG: 500 TABLET ORAL at 14:15

## 2022-07-15 RX ADMIN — FAMOTIDINE 20 MG: 10 INJECTION, SOLUTION INTRAVENOUS at 14:11

## 2022-07-15 RX ADMIN — SODIUM CHLORIDE, POTASSIUM CHLORIDE, SODIUM LACTATE AND CALCIUM CHLORIDE 9 ML/HR: 600; 310; 30; 20 INJECTION, SOLUTION INTRAVENOUS at 14:10

## 2022-07-15 RX ADMIN — FENTANYL CITRATE 25 MCG: 0.05 INJECTION, SOLUTION INTRAMUSCULAR; INTRAVENOUS at 16:35

## 2022-07-15 RX ADMIN — ROPIVACAINE HYDROCHLORIDE 10 ML: 2 INJECTION, SOLUTION EPIDURAL; INFILTRATION; PERINEURAL at 14:25

## 2022-07-15 RX ADMIN — ROPIVACAINE HYDROCHLORIDE 10 ML: 2 INJECTION, SOLUTION EPIDURAL; INFILTRATION at 14:21

## 2022-07-15 RX ADMIN — ONDANSETRON 4 MG: 2 INJECTION INTRAMUSCULAR; INTRAVENOUS at 14:11

## 2022-07-15 RX ADMIN — BUPIVACAINE HYDROCHLORIDE 10 ML: 2.5 INJECTION, SOLUTION EPIDURAL; INFILTRATION; INTRACAUDAL at 14:25

## 2022-07-15 RX ADMIN — BUPIVACAINE HYDROCHLORIDE 10 ML: 2.5 INJECTION, SOLUTION EPIDURAL; INFILTRATION; INTRACAUDAL; PERINEURAL at 14:21

## 2022-07-15 RX ADMIN — DEXMEDETOMIDINE 20 MCG: 100 INJECTION, SOLUTION, CONCENTRATE INTRAVENOUS at 14:21

## 2022-07-15 RX ADMIN — PROPOFOL 200 MG: 10 INJECTION, EMULSION INTRAVENOUS at 14:55

## 2022-07-15 RX ADMIN — OXYCODONE HYDROCHLORIDE AND ACETAMINOPHEN 1 TABLET: 5; 325 TABLET ORAL at 17:13

## 2022-07-15 RX ADMIN — DEXMEDETOMIDINE 20 MCG: 100 INJECTION, SOLUTION, CONCENTRATE INTRAVENOUS at 14:25

## 2022-07-15 RX ADMIN — FENTANYL CITRATE 25 MCG: 0.05 INJECTION, SOLUTION INTRAMUSCULAR; INTRAVENOUS at 16:30

## 2022-07-15 RX ADMIN — FENTANYL CITRATE 25 MCG: 50 INJECTION INTRAMUSCULAR; INTRAVENOUS at 14:55

## 2022-07-15 RX ADMIN — MIDAZOLAM HYDROCHLORIDE 1.5 MG: 1 INJECTION, SOLUTION INTRAMUSCULAR; INTRAVENOUS at 14:15

## 2022-07-15 RX ADMIN — FENTANYL CITRATE 25 MCG: 0.05 INJECTION, SOLUTION INTRAMUSCULAR; INTRAVENOUS at 16:25

## 2022-07-15 RX ADMIN — KETOROLAC TROMETHAMINE 30 MG: 30 INJECTION, SOLUTION INTRAMUSCULAR; INTRAVENOUS at 15:27

## 2022-07-15 RX ADMIN — VANCOMYCIN HYDROCHLORIDE 1250 MG: 1.25 INJECTION, POWDER, LYOPHILIZED, FOR SOLUTION INTRAVENOUS at 14:10

## 2022-07-15 RX ADMIN — MIDAZOLAM HYDROCHLORIDE 0.5 MG: 1 INJECTION, SOLUTION INTRAMUSCULAR; INTRAVENOUS at 14:13

## 2022-07-15 RX ADMIN — FENTANYL CITRATE 50 MCG: 50 INJECTION INTRAMUSCULAR; INTRAVENOUS at 15:37

## 2022-07-15 RX ADMIN — FENTANYL CITRATE 25 MCG: 50 INJECTION INTRAMUSCULAR; INTRAVENOUS at 15:36

## 2022-07-15 NOTE — ANESTHESIA PROCEDURE NOTES
Peripheral Block      Patient reassessed immediately prior to procedure    Patient location during procedure: pre-op  Start time: 7/15/2022 2:37 PM  Stop time: 7/15/2022 2:21 PM  Reason for block: at surgeon's request, post-op pain management and secondary anesthetic  Performed byKASIA: Sandra Jones CRNA  Preanesthetic Checklist  Completed: patient identified, IV checked, site marked, risks and benefits discussed, surgical consent, monitors and equipment checked, pre-op evaluation and timeout performed  Prep:  Pt Position: supine  Sterile barriers:alcohol skin prep, cap, gloves, mask, partial drape and washed/disinfected hands  Prep: ChloraPrep  Patient monitoring: blood pressure monitoring, continuous pulse oximetry and EKG  Procedure    Sedation: yes  Performed under: MAC  Guidance:ultrasound guided    ULTRASOUND INTERPRETATION.  Using ultrasound guidance a 21 G gauge needle was placed in close proximity to the femoral nerve, at which point, under ultrasound guidance anesthetic was injected in the area of the nerve and spread of the anesthesia was seen on ultrasound in close proximity thereto.  There were no abnormalities seen on ultrasound; a digital image was taken; and the patient tolerated the procedure with no complications. Images:still images obtained, printed/placed on chart    Laterality:right  Block Type:adductor canal block  Injection Technique:single-shot  Needle Type:echogenic  Needle Gauge:21 G  Resistance on Injection: none    Medications Used: bupivacaine PF (MARCAINE) injection 0.25%, 10 mL  ropivacaine (NAROPIN) 0.2% injection, 10 mL  Med administered at 7/15/2022 2:21 PM      Post Assessment  Injection Assessment: negative aspiration for heme, no paresthesia on injection and incremental injection  Patient Tolerance:comfortable throughout block  Complications:no

## 2022-07-15 NOTE — ANESTHESIA PROCEDURE NOTES
Airway  Urgency: elective    Date/Time: 7/15/2022 2:57 PM  Airway not difficult    General Information and Staff    Patient location during procedure: OR  CRNA/CAA: Juvenal Rivera CRNA    Indications and Patient Condition  Indications for airway management: airway protection    Preoxygenated: yes  Mask difficulty assessment: 0 - not attempted    Final Airway Details  Final airway type: supraglottic airway      Successful airway: unique  Size 4    Number of attempts at approach: 1  Assessment: lips, teeth, and gum same as pre-op

## 2022-07-15 NOTE — H&P
Orthopedic Surgery    Patient Care Team:  Daniel Chris MD as PCP - General (Orthopedic Surgery)    CHIEF COMPLAINT: Right knee pain    HISTORY OF PRESENT ILLNESS: 32-year-old male is being admitted this time to undergo excision of an infected right prepatellar bursa.  He is noted swelling for about 4 to 5 weeks and was seen in the emergency room several weeks ago was placed on antibiotics, Keflex and Bactrim.  Was seen in the office about a week ago he had moderate swelling and erythema but states he seemed to be decreasing on the medication.  Seen back in the office several days later with no change in the size of the bursa of the erythema or the discomfort so is being admitted this time to undergo excision of that bursa.  I discussed with the patient the procedure and he is in agreement          Past Medical History:   Diagnosis Date   • Migraines    • MRSA (methicillin resistant staph aureus) culture positive 01/28/2022    left knee     Past Surgical History:   Procedure Laterality Date   • LACERATION REPAIR Right 1990    index finger     Family History   Problem Relation Age of Onset   • Cancer Mother    • Crohn's disease Mother    • No Known Problems Father    • Malig Hyperthermia Neg Hx      Social History     Tobacco Use   • Smoking status: Current Every Day Smoker     Packs/day: 0.50     Years: 8.00     Pack years: 4.00     Types: Cigarettes   • Smokeless tobacco: Never Used   Vaping Use   • Vaping Use: Never used   Substance Use Topics   • Alcohol use: Never     Comment: rarely   • Drug use: Defer     No medications prior to admission.     No current facility-administered medications for this encounter.    Current Outpatient Medications:   •  cephalexin (KEFLEX) 500 MG capsule, Take 1 capsule by mouth 4 (Four) Times a Day for 7 days., Disp: 28 capsule, Rfl: 0  •  ibuprofen (ADVIL,MOTRIN) 400 MG tablet, Take 400 mg by mouth Every 6 (Six) Hours As Needed for Mild Pain ., Disp: , Rfl:   •  naproxen sodium  (ALEVE) 220 MG tablet, Take 440 mg by mouth 2 (Two) Times a Day As Needed for Mild Pain ., Disp: , Rfl:   •  omeprazole (priLOSEC) 20 MG capsule, Take 20 mg by mouth Daily., Disp: , Rfl:   •  sulfamethoxazole-trimethoprim (BACTRIM DS,SEPTRA DS) 800-160 MG per tablet, Take 1 tablet by mouth 2 (Two) Times a Day for 7 days., Disp: 14 tablet, Rfl: 0  •  Unable to find, 1 each 1 (One) Time. Med Name: PreWork out supplement, Disp: , Rfl:   •  Unable to find, Take 1 each by mouth Daily. Med Name: Dey Milk, Disp: , Rfl:   Immunization History   Administered Date(s) Administered   • Tdap 07/01/2018     Allergies:  Patient has no known allergies.    REVIEW OF SYSTEMS:  Please see the above history of present illness for pertinent positives and negatives.  The remainder of the patient's systems have been reviewed and are negative.    Vital Signs            Physical Exam:  Physical Exam   Constitutional: Patient appears well-developed and well-nourished and in no acute distress   HEENT:   Head: Normocephalic and atraumatic.   Eyes:  Pupils are equal, round, and reactive to light.  Mouth and Throat: Patient has moist mucous membranes. Oropharynx is clear of any erythema or exudate.     Neck: Neck supple. No JVD present. No thyromegaly present. No lymphadenopathy present.  Cardiovascular: Regular rate, regular rhythm.  Pulmonary/Chest: Lungs are clear to auscultation bilaterally.  Abdominal:benign,soft with bowel sounds  Musculoskeletal: Normal posture.  Extremities: Right leg has no motor or sensory deficit.  The knee shows no effusion but he has a large swollen prepatellar bursa with localized erythema and tenderness.  Bursa probably measures 8 x 4 cm.  His calf is nontender.  Is good distal pulses good capillary refill.  Neurological: Patient is alert and oriented.  Psychological:   Mood and behavior appropriate.  Skin: Skin is warm and dry.  Debilities/Disabilities Identified: None   Results Review:    I reviewed the  patient's new clinical results.  Lab Results (most recent)     None          Imaging Results (Most Recent)     None            ECG/EMG Results (most recent)     None            Assessment & Plan Infected right prepatellar bursa        I discussed the patients findings and my recommendations with patient and discussed with the patient surgical excision of the bursa continuation of the antibiotics.  Reviewed the risk and benefits of surgery with the patient.  We will place him in a knee immobilizer postoperatively and continue the antibiotics until seen back in the office in 2 weeks.  Patient was in agreement.  Answered all questions    Daniel Chris MD  07/15/22  10:12 EDT

## 2022-07-15 NOTE — ANESTHESIA PREPROCEDURE EVALUATION
Anesthesia Evaluation     Patient summary reviewed and Nursing notes reviewed   no history of anesthetic complications:  NPO Solid Status: > 8 hours  NPO Liquid Status: < 2 hours           Airway   Mallampati: II  TM distance: <3 FB  Neck ROM: full  No difficulty expected  Dental      Comment: Crown back bottom right, rebuild front incisor area    Pulmonary - normal exam   (+) a smoker Current Smoked day of surgery,   (-) shortness of breath  Cardiovascular - normal exam  Exercise tolerance: good (4-7 METS)    ECG reviewed    (-) angina      Neuro/Psych  (+) psychiatric history (As kid) ADD and ADHD,    GI/Hepatic/Renal/Endo    (+)  GERD well controlled,      Musculoskeletal     Abdominal  - normal exam   Substance History   (+) alcohol use,      OB/GYN negative ob/gyn ROS         Other   arthritis,                    Anesthesia Plan    ASA 2     general with block     intravenous induction     Anesthetic plan, risks, benefits, and alternatives have been provided, discussed and informed consent has been obtained with: patient.  Use of blood products discussed with patient  Consented to blood products.       CODE STATUS:

## 2022-07-15 NOTE — OP NOTE
Preoperative Diagnosis: Infected right prepatellar bursa    Postoperative Diagnosis: Same    Procedure Performed: Excision of right prepatellar bursa     Surgeon: Aries      Assistant: Gladis Giordano who assisted with patient positioning, retraction, irrigation, wound closure and dressing application    Anesthesia: Gen. with abductor canal block and peripheral pain block      Anesthetist: Juvenal Rivera    Drains: X1    Estimated blood loss 25 cc    Complications: None        Indications for procedure: 32-year-old male with an infected right prepatellar bursa that failed to respond to nonoperative management          Operative Note: Patient brought to the operating room and placed supine on the operating table.  After satisfactory anesthesia was obtained timeout completed identifying the patient and the procedure correctly.  The monitors placed on the right upper leg the right leg was then prepped with Betadine scrub and paint as he had a small open lesion on the superolateral aspect of the bursa.  After the prep and the wound was draped in sterile from usual manner with timeout again completed.  A tourniquet which had been placed on the upper thigh was elevated but the leg was not exsanguinated.  Tourniquet is elevated to 150 mmHg.  A midline incision was then made and full-thickness subcutaneous flaps were developed medially and laterally.  Laterally where there was an open wound about 3 cm from the incisional area a 1-1/2 x 5 cm excision of the skin and the underlying necrotic tissue was clipped and gluteal exercised.  Cultures were taken of the necrotic fluid within the bursa and the bursa after complete excision was sent to the lab work for the studies.  Gram stain aerobic and anaerobic cultures will be obtained of both the fluid and the tissue.  After all necrotic tissue was excised using a rongeur the wound is irrigated with approximate 100 cc of Betadine solution and 300 cc of the irrisept solution.   Tourniquet was released with hemostasis to electrocautery.  #7 Penrose drain was inserted through the superior medial stab wound.  Half a gram of vancomycin powder was placed in the wound.  The elliptical incisional skin area was closed in interrupted 2-0 Vicryl and an interrupted 3-0 nylon for the skin.  The major longitudinal incision was closed in interrupted 2-0 Vicryl and skin staples.  A bulky sterile compressive dressing was then applied including ointment Adaptic 4 x 4's and ABDs.  Ace wraps were also applied around the wound and the patient was placed in a knee immobilizer awakened taken to recovery having tolerated the procedure well.  All counts were correct            Dictated utilizing Dragon dictation

## 2022-07-15 NOTE — ANESTHESIA POSTPROCEDURE EVALUATION
Patient: Jonny Arellano    Procedure Summary     Date: 07/15/22 Room / Location:  [facility] OR 2 /  [facility]    Anesthesia Start: 1451 Anesthesia Stop: 1556    Procedure: PATELLA BURSA EXCISION (Right ) Diagnosis:       Infection of right prepatellar bursa      (Infection of right prepatellar bursa [M71.161])    Surgeons: Daniel Chris MD Provider: Juvenal Rivera CRNA    Anesthesia Type: general with block ASA Status: 2          Anesthesia Type: general with block    Vitals  Vitals Value Taken Time   /70 07/15/22 1655   Temp 97.8 °F (36.6 °C) 07/15/22 1610   Pulse 51 07/15/22 1659   Resp 10 07/15/22 1655   SpO2 98 % 07/15/22 1659   Vitals shown include unvalidated device data.        Post Anesthesia Care and Evaluation    Patient location during evaluation: PHASE II  Patient participation: complete - patient participated  Level of consciousness: awake and alert  Pain score: 0  Pain management: adequate    Airway patency: patent  Anesthetic complications: No anesthetic complications  PONV Status: none  Cardiovascular status: acceptable  Respiratory status: acceptable  Hydration status: acceptable

## 2022-07-15 NOTE — ANESTHESIA PROCEDURE NOTES
Peripheral Block      Patient reassessed immediately prior to procedure    Patient location during procedure: pre-op  Start time: 7/15/2022 2:22 PM  Stop time: 7/15/2022 2:25 PM  Reason for block: at surgeon's request, post-op pain management and secondary anesthetic  Performed by  CRNA/CAA: Sandra Jones CRNA  Preanesthetic Checklist  Completed: patient identified, IV checked, site marked, risks and benefits discussed, surgical consent, monitors and equipment checked, pre-op evaluation and timeout performed  Prep:  Sterile barriers:alcohol skin prep, cap, gloves, mask, partial drape and washed/disinfected hands  Prep: ChloraPrep  Patient monitoring: blood pressure monitoring, EKG and continuous pulse oximetry  Procedure    Sedation: yes  Performed under: MAC  Guidance:nerve stimulator  Images:still images obtained, printed/placed on chart    Laterality:right  Block Type:iPack  Injection Technique:single-shot  Needle Type:echogenic  Needle Gauge:21 G  Resistance on Injection: none    Medications Used: bupivacaine PF (MARCAINE) injection 0.25%, 10 mL  ropivacaine (NAROPIN) 0.2% injection, 10 mL  Med administered at 7/15/2022 2:25 PM      Post Assessment  Injection Assessment: negative aspiration for heme, no paresthesia on injection and incremental injection  Patient Tolerance:comfortable throughout block  Complications:no

## 2022-07-18 ENCOUNTER — TELEPHONE (OUTPATIENT)
Dept: ORTHOPEDIC SURGERY | Facility: CLINIC | Age: 32
End: 2022-07-18

## 2022-07-18 ENCOUNTER — OFFICE VISIT (OUTPATIENT)
Dept: ORTHOPEDIC SURGERY | Facility: CLINIC | Age: 32
End: 2022-07-18

## 2022-07-18 DIAGNOSIS — M25.561 ACUTE PAIN OF RIGHT KNEE: Primary | ICD-10-CM

## 2022-07-18 DIAGNOSIS — Z09 STATUS POST ORTHOPEDIC SURGERY, FOLLOW-UP EXAM: ICD-10-CM

## 2022-07-18 LAB
BACTERIA SPEC AEROBE CULT: NORMAL
BACTERIA SPEC AEROBE CULT: NORMAL
GRAM STN SPEC: NORMAL

## 2022-07-18 PROCEDURE — 99024 POSTOP FOLLOW-UP VISIT: CPT | Performed by: ORTHOPAEDIC SURGERY

## 2022-07-18 RX ORDER — PREGABALIN 75 MG/1
75 CAPSULE ORAL 2 TIMES DAILY
Qty: 60 CAPSULE | Refills: 0 | Status: SHIPPED | OUTPATIENT
Start: 2022-07-18

## 2022-07-18 RX ORDER — IBUPROFEN 800 MG/1
800 TABLET ORAL 3 TIMES DAILY
Qty: 90 TABLET | Refills: 2 | Status: SHIPPED | OUTPATIENT
Start: 2022-07-18

## 2022-07-18 NOTE — TELEPHONE ENCOUNTER
----- Message from Jonny Arellano sent at 7/18/2022  2:25 PM EDT -----  Regarding: New prescription!   Just went to  prescriptions from the pharmacy and said that one was waiting on drs approval before it can be filled. I called the office and they transferred me to someone and I had to leave a message! Thank you!

## 2022-07-18 NOTE — PROGRESS NOTES
Subjective: Status post excision of infected right prepatellar bursa     Patient ID: Jonny Arellano is a 32 y.o. male.    Chief Complaint:    History of Present Illness patient is 3 days status post above-stated procedure.  Seen today for dressing change has been experiencing moderate pain marginally controlled with the 1 Percocet 7.5 daily       Social History     Occupational History   • Not on file   Tobacco Use   • Smoking status: Current Every Day Smoker     Packs/day: 0.50     Years: 8.00     Pack years: 4.00     Types: Cigarettes   • Smokeless tobacco: Never Used   • Tobacco comment: pt has smoked today   Vaping Use   • Vaping Use: Never used   Substance and Sexual Activity   • Alcohol use: Never     Comment: rarely   • Drug use: Defer   • Sexual activity: Yes      Review of Systems      Past Medical History:   Diagnosis Date   • Migraines    • MRSA (methicillin resistant staph aureus) culture positive 01/28/2022    left knee     Past Surgical History:   Procedure Laterality Date   • LACERATION REPAIR Right 1990    index finger     Family History   Problem Relation Age of Onset   • Cancer Mother    • Crohn's disease Mother    • No Known Problems Father    • Malig Hyperthermia Neg Hx          Objective:  There were no vitals filed for this visit.  There were no vitals filed for this visit.  There is no height or weight on file to calculate BMI.        Ortho Exam   He is alert and oriented x3.  Dressing change was quite bloody.  Has a mild to moderate hematoma underneath the skin, no effusion in the knee joint itself.  The calf is nontender.  Incision looks okay.  Is good distal pulses no motor or sensory deficit.    Assessment:        1. Acute pain of right knee    2. Status post orthopedic surgery, follow-up exam           Plan: Dressing was changed in the drain was removed.  New sterile dressing applied again with 4 x 4's Curlex and an Ace wrap.  We will add Lyrica to the pain control, 75 mg twice a day.   Also advised on 800 of ibuprofen 3 times a day and instructed to take 2 of the Percocet every 4 hours as needed for pain.  He will return to see me on August 2 for suture movable as instructed if he has any problems particular starts developing a fever or odor from the wound he is to be seen next week for another dressing change or refill pain medication if needed total time to see me in August 2.  Answered all questions            Work Status:    SUE query complete.    Orders:  No orders of the defined types were placed in this encounter.      Medications:  New Medications Ordered This Visit   Medications   • pregabalin (Lyrica) 75 MG capsule     Sig: Take 1 capsule by mouth 2 (Two) Times a Day.     Dispense:  60 capsule     Refill:  0   • ibuprofen (ADVIL,MOTRIN) 800 MG tablet     Sig: Take 1 tablet by mouth 3 (Three) Times a Day.     Dispense:  90 tablet     Refill:  2       Followup:  Return in about 15 days (around 8/2/2022).          Dictated utilizing Dragon dictation

## 2022-07-18 NOTE — TELEPHONE ENCOUNTER
Jonny Arellano (Key: [id number])  Rx #: [id number]  Pregabalin 75MG capsules     Form  [employer] [place] Medicaid ePA Form 2017 NCPDP  Created  2 hours ago  Sent to Plan  1 hour ago  Plan Response  1 hour ago  Submit Clinical Questions  1 hour ago  Determination-will await- patient aware it can take up to 24 hours before a decision to be made by insurance.

## 2022-07-18 NOTE — TELEPHONE ENCOUNTER
Called and spoke to patient. PA approved-authorization is effective for a maximum of 12 fills from 07/18/2022 to 07/17/2023, as long as the member is enrolled in their current health plan.

## 2022-07-18 NOTE — TELEPHONE ENCOUNTER
Message from Plan  The request has been approved. The authorization is effective for a maximum of 12 fills from 07/18/2022 to 07/17/2023, as long as the member is enrolled in their current health plan. The request was reviewed and approved by a licensed clinical pharmacist. This request has been approved with a quantity limit of 3 capsules per day. A written notification letter will follow with additional details.    Attempted to contact patient-no answer and the voicemail is full.

## 2022-07-20 ENCOUNTER — TELEPHONE (OUTPATIENT)
Dept: ORTHOPEDIC SURGERY | Facility: CLINIC | Age: 32
End: 2022-07-20

## 2022-07-20 DIAGNOSIS — M71.161 INFECTION OF RIGHT PREPATELLAR BURSA: ICD-10-CM

## 2022-07-20 RX ORDER — OXYCODONE AND ACETAMINOPHEN 7.5; 325 MG/1; MG/1
1 TABLET ORAL EVERY 4 HOURS PRN
Qty: 42 TABLET | Refills: 0 | Status: SHIPPED | OUTPATIENT
Start: 2022-07-20 | End: 2022-07-28

## 2022-07-20 NOTE — TELEPHONE ENCOUNTER
Rx Refill Note  Requested Prescriptions     Pending Prescriptions Disp Refills   • oxyCODONE-acetaminophen (PERCOCET) 7.5-325 MG per tablet 42 tablet 0     Sig: Take 1 tablet by mouth Every 4 (Four) Hours As Needed for Moderate Pain  or Severe Pain.      Last office visit with prescribing clinician: 7/18/2022      Next office visit with prescribing clinician: 8/2/2022   Last Filled:07.15.2022    Encounter Diagnosis   Name Primary?   • Infection of right prepatellar bursa       Date of Surgery:07.15.2022      Mame Cordero MA  07/20/22, 12:18 EDT    Previous RX pended for your approval, change or denial.     {TIP  Encounters:    {TIP  Please add Last Relevant Lab Date if appropriate:  {TIP  Is Refill Pharmacy correct?:

## 2022-07-20 NOTE — TELEPHONE ENCOUNTER
Patient's s/o calling leaving a voicemail stating that we did not send in his pain medication. Per the chart the RX for Oxycodone/acetaminophen was sent in on 07.20.2022 @ 12:27pm.    Advised that they need to contact the pharmacy.

## 2022-07-20 NOTE — TELEPHONE ENCOUNTER
Patient's mother calling [facility] will not fill the RX as written as per the office note on 07.18.2022 states the patient can take up to 2 tablets at a time.    Rx Refill Note  Requested Prescriptions     Pending Prescriptions Disp Refills   • oxyCODONE-acetaminophen (PERCOCET) 7.5-325 MG per tablet 42 tablet 0     Sig: Take 1-2 tablets by mouth every 4-6 hours as needed for moderate to severe pain. Max of 6 tablets per day.      Last office visit with prescribing clinician: 7/18/2022      Next office visit with prescribing clinician: 8/2/2022   Last Filled:07.15.2022    Encounter Diagnosis   Name Primary?   • Infection of right prepatellar bursa       Date of Surgery:07.15.2022      Mame Cordero MA  07/20/22, 15:39 EDT    Previous RX pended for your approval, change or denial.     {TIP  Encounters:    {TIP  Please add Last Relevant Lab Date if appropriate:  {TIP  Is Refill Pharmacy correct?:

## 2022-07-21 DIAGNOSIS — Z09 STATUS POST ORTHOPEDIC SURGERY, FOLLOW-UP EXAM: ICD-10-CM

## 2022-07-21 DIAGNOSIS — M71.161 INFECTION OF RIGHT PREPATELLAR BURSA: Primary | ICD-10-CM

## 2022-07-21 RX ORDER — SULFAMETHOXAZOLE AND TRIMETHOPRIM 800; 160 MG/1; MG/1
TABLET ORAL
Qty: 14 TABLET | Refills: 0 | Status: SHIPPED | OUTPATIENT
Start: 2022-07-21 | End: 2022-08-02

## 2022-07-21 RX ORDER — OXYCODONE AND ACETAMINOPHEN 7.5; 325 MG/1; MG/1
TABLET ORAL
Qty: 42 TABLET | Refills: 0 | Status: CANCELLED | OUTPATIENT
Start: 2022-07-21

## 2022-07-21 RX ORDER — OXYCODONE AND ACETAMINOPHEN 7.5; 325 MG/1; MG/1
TABLET ORAL
Qty: 42 TABLET | Refills: 0 | Status: SHIPPED | OUTPATIENT
Start: 2022-07-21 | End: 2022-07-28

## 2022-07-21 NOTE — TELEPHONE ENCOUNTER
Rx Refill Note  Requested Prescriptions     Pending Prescriptions Disp Refills    sulfamethoxazole-trimethoprim (BACTRIM DS,SEPTRA DS) 800-160 MG per tablet [Pharmacy Med Name: Sulfamethoxazole-Trimethoprim 800-160 MG Oral Tablet] 14 tablet 0     Sig: Take 1 tablet by mouth twice daily for 7 days      Last office visit with prescribing clinician: 7/18/2022      Next office visit with prescribing clinician: 8/2/2022   Last Filled:07.18.2022      Date of Surgery:07.15.2022      Mame Cordero MA  07/21/22, 12:23 EDT    {TIP  Encounters:    {TIP  Please add Last Relevant Lab Date if appropriate:  {TIP  Is Refill Pharmacy correct?:

## 2022-07-22 ENCOUNTER — TELEPHONE (OUTPATIENT)
Dept: ORTHOPEDIC SURGERY | Facility: CLINIC | Age: 32
End: 2022-07-22

## 2022-07-22 NOTE — TELEPHONE ENCOUNTER
Patient's emg contact calling stating that the patient has thrush- they are requesting something be sent in.    DX:Status post excision of infected right prepatellar bursa

## 2022-07-25 RX ORDER — CEPHALEXIN 500 MG/1
CAPSULE ORAL
Qty: 28 CAPSULE | Refills: 0 | Status: SHIPPED | OUTPATIENT
Start: 2022-07-25 | End: 2022-08-19

## 2022-07-28 ENCOUNTER — TELEPHONE (OUTPATIENT)
Dept: ORTHOPEDIC SURGERY | Facility: CLINIC | Age: 32
End: 2022-07-28

## 2022-07-28 DIAGNOSIS — Z09 STATUS POST ORTHOPEDIC SURGERY, FOLLOW-UP EXAM: ICD-10-CM

## 2022-07-28 DIAGNOSIS — M71.161 INFECTION OF RIGHT PREPATELLAR BURSA: ICD-10-CM

## 2022-07-28 RX ORDER — OXYCODONE AND ACETAMINOPHEN 7.5; 325 MG/1; MG/1
TABLET ORAL
Qty: 30 TABLET | Refills: 0 | Status: SHIPPED | OUTPATIENT
Start: 2022-07-28 | End: 2022-08-03 | Stop reason: SDUPTHER

## 2022-07-28 NOTE — TELEPHONE ENCOUNTER
Jonny Arellano Key: [id number] - PA Case ID: [id number] - Rx #: [id number] help? Call us at [phone]  Status  Sent to [facility]  Drug  oxyCODONE-Acetaminophen 7.5-325MG tablets  Form  [employer] [place] Medicaid ePA Form 2017 NCPDP  Original Claim Info  88,76,9G TRANS FEE = 0.00OPIOID MAXIMUM DURATION EXCEEDED. PRESCRIBER MUST INITIATE THE PA REQUESTEXCEEDING QUANTITY LIMIT; PRIOR AUTHORIZATION REQUIRED CALL [phone] MORPHINE EQUIVALENT DOSE OF      PA complete-Will await results.

## 2022-07-29 ENCOUNTER — TELEPHONE (OUTPATIENT)
Dept: ORTHOPEDIC SURGERY | Facility: CLINIC | Age: 32
End: 2022-07-29

## 2022-07-29 NOTE — TELEPHONE ENCOUNTER
PA denied for oxycodone, patient aware they will need to pay for it.       Ace bandages up front to

## 2022-07-29 NOTE — TELEPHONE ENCOUNTER
Caller: RICHARD RAMOS    Relationship: Emergency Contact    Best call back number: [phone]    What was the call regarding: PT IS 2 WEEKS POST OP ON HIS RT KNEE SURGERY. HE HAS DRAINAGE THAT HAS COME HIS ACE BANDAGE. THEY WOULD LIKE TO KNOW IF THEY CAN  A NEW ONE. OFFICE HAD SAID TO CALL IF THEY NEED NEW DRESSING MATERIALS.     Do you require a callback: YES

## 2022-08-02 ENCOUNTER — OFFICE VISIT (OUTPATIENT)
Dept: ORTHOPEDIC SURGERY | Facility: CLINIC | Age: 32
End: 2022-08-02

## 2022-08-02 VITALS — BODY MASS INDEX: 24.32 KG/M2 | WEIGHT: 173.72 LBS | HEIGHT: 71 IN

## 2022-08-02 DIAGNOSIS — M71.161 INFECTION OF RIGHT PREPATELLAR BURSA: Primary | ICD-10-CM

## 2022-08-02 DIAGNOSIS — Z09 STATUS POST ORTHOPEDIC SURGERY, FOLLOW-UP EXAM: ICD-10-CM

## 2022-08-02 PROCEDURE — 99024 POSTOP FOLLOW-UP VISIT: CPT | Performed by: ORTHOPAEDIC SURGERY

## 2022-08-02 RX ORDER — SULFAMETHOXAZOLE AND TRIMETHOPRIM 800; 160 MG/1; MG/1
TABLET ORAL
Qty: 14 TABLET | Refills: 0 | Status: SHIPPED | OUTPATIENT
Start: 2022-08-02 | End: 2022-08-19

## 2022-08-02 NOTE — PROGRESS NOTES
Subjective: Status post excision of infected right prepatellar bursa     Patient ID: Jonny Arellano is a 32 y.o. male.    Chief Complaint:    History of Present Illness patient is 2 weeks out.  Still having some pain a little more than expected but is doing well.  Has been ambulating with a knee brace.  His cultures from surgery are all negative he can stop all antibiotics       Social History     Occupational History   • Not on file   Tobacco Use   • Smoking status: Current Every Day Smoker     Packs/day: 0.50     Years: 8.00     Pack years: 4.00     Types: Cigarettes   • Smokeless tobacco: Never Used   • Tobacco comment: pt has smoked today   Vaping Use   • Vaping Use: Never used   Substance and Sexual Activity   • Alcohol use: Never     Comment: rarely   • Drug use: Defer   • Sexual activity: Yes      Review of Systems   Constitutional: Negative for chills, diaphoresis, fever and unexpected weight change.   HENT: Negative for hearing loss, nosebleeds, sore throat and tinnitus.    Eyes: Negative for pain and visual disturbance.   Respiratory: Negative for cough, shortness of breath and wheezing.    Cardiovascular: Negative for chest pain and palpitations.   Gastrointestinal: Negative for abdominal pain, diarrhea, nausea and vomiting.   Endocrine: Negative for cold intolerance, heat intolerance and polydipsia.   Genitourinary: Negative for difficulty urinating, dysuria and hematuria.   Musculoskeletal: Positive for arthralgias, joint swelling and myalgias.   Skin: Negative for rash and wound.   Allergic/Immunologic: Negative for environmental allergies.   Neurological: Negative for dizziness, syncope and numbness.   Hematological: Does not bruise/bleed easily.   Psychiatric/Behavioral: Negative for dysphoric mood and sleep disturbance. The patient is not nervous/anxious.          Past Medical History:   Diagnosis Date   • Migraines    • MRSA (methicillin resistant staph aureus) culture positive 01/28/2022    left  knee     Past Surgical History:   Procedure Laterality Date   • INCISION AND DRAINAGE LEG Right 7/15/2022    Procedure: PATELLA BURSA EXCISION;  Surgeon: Daniel Chris MD;  Location: [facility];  Service: Orthopedics;  Laterality: Right;   • LACERATION REPAIR Right 1990    index finger     Family History   Problem Relation Age of Onset   • Cancer Mother    • Crohn's disease Mother    • No Known Problems Father    • Malig Hyperthermia Neg Hx          Objective:  There were no vitals filed for this visit.      08/02/22  1500   Weight: 78.8 kg (173 lb 11.6 oz)     Body mass index is 24.23 kg/m².        Ortho Exam   He is alert and oriented x3.  The wound for the most part is benign the longitudinal incision is benign and the staples were removed.  He did have 1 small area laterally with his peers to be a little bit of skin slough but there is no drainage.  The calf is nontender.  Good distal pulses    Assessment:        1. Infection of right prepatellar bursa    2. Status post orthopedic surgery, follow-up exam           Plan: I will DC the knee immobilizer placed a sterile dressing over the wound.  I have made appointment with the wound care center to evaluate that small area of skin slough.  Weightbearing as tolerated.  Return to see me on August 9.  He was returned to work.  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  Lots of to stand.  Answered all questions            Work Status:    SUE query complete.    Orders:  Orders Placed This Encounter   Procedures   • Ambulatory Referral to Wound Clinic       Medications:  No orders of the defined types were placed in this encounter.      Followup:  Return in about 9 days (around 8/11/2022).          Dictated utilizing Dragon dictation

## 2022-08-02 NOTE — TELEPHONE ENCOUNTER
Rx Refill Note  Requested Prescriptions     Pending Prescriptions Disp Refills    sulfamethoxazole-trimethoprim (BACTRIM DS,SEPTRA DS) 800-160 MG per tablet [Pharmacy Med Name: Sulfamethoxazole-Trimethoprim 800-160 MG Oral Tablet] 14 tablet 0     Sig: Take 1 tablet by mouth twice daily for 7 days      Last office visit with prescribing clinician: 7/18/2022      Next office visit with prescribing clinician: 8/2/2022   Last Filled:07.21.2022    DX:Status post excision of infected right prepatellar bursa    Mame Cordero MA  08/02/22, 09:33 EDT    {TIP  Encounters:    {TIP  Please add Last Relevant Lab Date if appropriate:  {TIP  Is Refill Pharmacy correct?:

## 2022-08-03 DIAGNOSIS — M71.161 INFECTION OF RIGHT PREPATELLAR BURSA: ICD-10-CM

## 2022-08-03 DIAGNOSIS — Z09 STATUS POST ORTHOPEDIC SURGERY, FOLLOW-UP EXAM: ICD-10-CM

## 2022-08-03 RX ORDER — OXYCODONE AND ACETAMINOPHEN 7.5; 325 MG/1; MG/1
TABLET ORAL
Qty: 30 TABLET | Refills: 0 | Status: SHIPPED | OUTPATIENT
Start: 2022-08-03 | End: 2022-08-10

## 2022-08-03 NOTE — TELEPHONE ENCOUNTER
Rx Refill Note  Requested Prescriptions     Pending Prescriptions Disp Refills   • oxyCODONE-acetaminophen (PERCOCET) 7.5-325 MG per tablet 30 tablet 0     Sig: Take 1 to 2 tablets every 4-6 hours as needed for moderate-severe pain      Last office visit with prescribing clinician: 8/2/2022      Next office visit with prescribing clinician: 8/11/2022   Last Filled:    Encounter Diagnoses   Name Primary?   • Infection of right prepatellar bursa    • Status post orthopedic surgery, follow-up exam       Date of Surgery:07.15.2022      Mame Cordero MA  08/03/22, 13:39 EDT    Previous RX pended for your approval, change or denial.     {TIP  Encounters:    {TIP  Please add Last Relevant Lab Date if appropriate:  {TIP  Is Refill Pharmacy correct?:

## 2022-08-08 ENCOUNTER — LAB REQUISITION (OUTPATIENT)
Dept: LAB | Facility: HOSPITAL | Age: 32
End: 2022-08-08

## 2022-08-08 ENCOUNTER — APPOINTMENT (OUTPATIENT)
Dept: WOUND CARE | Facility: HOSPITAL | Age: 32
End: 2022-08-08

## 2022-08-08 DIAGNOSIS — S81.001A UNSPECIFIED OPEN WOUND, RIGHT KNEE, INITIAL ENCOUNTER: ICD-10-CM

## 2022-08-08 DIAGNOSIS — F17.210 NICOTINE DEPENDENCE, CIGARETTES, UNCOMPLICATED: ICD-10-CM

## 2022-08-08 DIAGNOSIS — M70.41 PREPATELLAR BURSITIS, RIGHT KNEE: ICD-10-CM

## 2022-08-08 DIAGNOSIS — T81.31XA DISRUPTION OF EXTERNAL OPERATION (SURGICAL) WOUND, NOT ELSEWHERE CLASSIFIED, INITIAL ENCOUNTER: ICD-10-CM

## 2022-08-08 PROCEDURE — G0463 HOSPITAL OUTPT CLINIC VISIT: HCPCS

## 2022-08-08 PROCEDURE — 87077 CULTURE AEROBIC IDENTIFY: CPT | Performed by: SURGERY

## 2022-08-08 PROCEDURE — 87070 CULTURE OTHR SPECIMN AEROBIC: CPT | Performed by: SURGERY

## 2022-08-08 PROCEDURE — 87186 SC STD MICRODIL/AGAR DIL: CPT | Performed by: SURGERY

## 2022-08-08 PROCEDURE — 87205 SMEAR GRAM STAIN: CPT | Performed by: SURGERY

## 2022-08-10 DIAGNOSIS — Z09 STATUS POST ORTHOPEDIC SURGERY, FOLLOW-UP EXAM: ICD-10-CM

## 2022-08-10 DIAGNOSIS — M71.161 INFECTION OF RIGHT PREPATELLAR BURSA: Primary | ICD-10-CM

## 2022-08-10 DIAGNOSIS — M71.161 INFECTION OF RIGHT PREPATELLAR BURSA: ICD-10-CM

## 2022-08-10 RX ORDER — OXYCODONE HYDROCHLORIDE AND ACETAMINOPHEN 5; 325 MG/1; MG/1
1 TABLET ORAL EVERY 8 HOURS PRN
Qty: 36 TABLET | Refills: 0 | Status: SHIPPED | OUTPATIENT
Start: 2022-08-10 | End: 2022-08-19 | Stop reason: SDUPTHER

## 2022-08-10 RX ORDER — OXYCODONE AND ACETAMINOPHEN 7.5; 325 MG/1; MG/1
TABLET ORAL
Qty: 30 TABLET | Refills: 0 | Status: CANCELLED | OUTPATIENT
Start: 2022-08-10

## 2022-08-10 NOTE — TELEPHONE ENCOUNTER
Please see patient’s new wound culture labs.      Rx Refill Note  Requested Prescriptions     Pending Prescriptions Disp Refills   • oxyCODONE-acetaminophen (PERCOCET) 7.5-325 MG per tablet 30 tablet 0     Sig: Take 1 to 2 tablets every 4-6 hours as needed for moderate-severe pain      Last office visit with prescribing clinician: 8/2/2022      Next office visit with prescribing clinician: 8/11/2022   Last Filled:08.03.2022    Encounter Diagnoses   Name Primary?   • Infection of right prepatellar bursa    • Status post orthopedic surgery, follow-up exam       Date of Surgery:Status post excision of infected right prepatellar bursa 07.15.2022      Mame Cordero MA  08/10/22, 12:32 EDT    Previous RX pended for your approval, change or denial.     {

## 2022-08-11 ENCOUNTER — OFFICE VISIT (OUTPATIENT)
Dept: ORTHOPEDIC SURGERY | Facility: CLINIC | Age: 32
End: 2022-08-11

## 2022-08-11 ENCOUNTER — APPOINTMENT (OUTPATIENT)
Dept: WOUND CARE | Facility: HOSPITAL | Age: 32
End: 2022-08-11

## 2022-08-11 VITALS — BODY MASS INDEX: 24.32 KG/M2 | WEIGHT: 173.72 LBS | HEIGHT: 71 IN

## 2022-08-11 DIAGNOSIS — Z09 STATUS POST ORTHOPEDIC SURGERY, FOLLOW-UP EXAM: Primary | ICD-10-CM

## 2022-08-11 DIAGNOSIS — M71.161 INFECTION OF RIGHT PREPATELLAR BURSA: ICD-10-CM

## 2022-08-11 LAB
BACTERIA SPEC AEROBE CULT: ABNORMAL
BACTERIA SPEC AEROBE CULT: ABNORMAL
GRAM STN SPEC: ABNORMAL
GRAM STN SPEC: ABNORMAL

## 2022-08-11 PROCEDURE — G0463 HOSPITAL OUTPT CLINIC VISIT: HCPCS

## 2022-08-11 PROCEDURE — 99024 POSTOP FOLLOW-UP VISIT: CPT | Performed by: ORTHOPAEDIC SURGERY

## 2022-08-15 ENCOUNTER — APPOINTMENT (OUTPATIENT)
Dept: WOUND CARE | Facility: HOSPITAL | Age: 32
End: 2022-08-15

## 2022-08-15 PROCEDURE — G0463 HOSPITAL OUTPT CLINIC VISIT: HCPCS

## 2022-08-18 ENCOUNTER — APPOINTMENT (OUTPATIENT)
Dept: PHYSICAL THERAPY | Facility: HOSPITAL | Age: 32
End: 2022-08-18

## 2022-08-19 ENCOUNTER — OFFICE VISIT (OUTPATIENT)
Dept: ORTHOPEDIC SURGERY | Facility: CLINIC | Age: 32
End: 2022-08-19

## 2022-08-19 VITALS — BODY MASS INDEX: 24.22 KG/M2 | WEIGHT: 173 LBS | HEIGHT: 71 IN

## 2022-08-19 DIAGNOSIS — M71.161 INFECTION OF RIGHT PREPATELLAR BURSA: ICD-10-CM

## 2022-08-19 DIAGNOSIS — Z09 STATUS POST ORTHOPEDIC SURGERY, FOLLOW-UP EXAM: ICD-10-CM

## 2022-08-19 PROCEDURE — 99024 POSTOP FOLLOW-UP VISIT: CPT | Performed by: ORTHOPAEDIC SURGERY

## 2022-08-19 RX ORDER — OXYCODONE HYDROCHLORIDE AND ACETAMINOPHEN 5; 325 MG/1; MG/1
1 TABLET ORAL EVERY 8 HOURS PRN
Qty: 36 TABLET | Refills: 0 | Status: SHIPPED | OUTPATIENT
Start: 2022-08-19 | End: 2022-08-29 | Stop reason: SDUPTHER

## 2022-08-19 RX ORDER — AMOXICILLIN AND CLAVULANATE POTASSIUM 875; 125 MG/1; MG/1
TABLET, FILM COATED ORAL
COMMUNITY
Start: 2022-08-16 | End: 2022-08-30

## 2022-08-19 NOTE — PROGRESS NOTES
Subjective: Status post excision right prepatellar infected bursa     Patient ID: Jonny Arellano is a 32 y.o. male.    Chief Complaint:    History of Present Illness patient returns early is concerned about swelling to the knee.  Again he is in wound management and they have about dressing changes and he does have some erythema but there is no effusion in the knee joint itself there is swelling about the knee but no effusion in the knee is not running any fever.  Cultures done by wound care showed he has Enterococcus faecalis infection they have him on amoxicillin which is appropriate antibiotics.       Social History     Occupational History   • Not on file   Tobacco Use   • Smoking status: Current Every Day Smoker     Packs/day: 0.50     Years: 8.00     Pack years: 4.00     Types: Cigarettes   • Smokeless tobacco: Never Used   • Tobacco comment: pt has smoked today   Vaping Use   • Vaping Use: Never used   Substance and Sexual Activity   • Alcohol use: Never     Comment: rarely   • Drug use: Defer   • Sexual activity: Yes      Review of Systems      Past Medical History:   Diagnosis Date   • Migraines    • MRSA (methicillin resistant staph aureus) culture positive 01/28/2022    left knee     Past Surgical History:   Procedure Laterality Date   • INCISION AND DRAINAGE LEG Right 7/15/2022    Procedure: PATELLA BURSA EXCISION;  Surgeon: Daniel Chris MD;  Location: [facility];  Service: Orthopedics;  Laterality: Right;   • LACERATION REPAIR Right 1990    index finger     Family History   Problem Relation Age of Onset   • Cancer Mother    • Crohn's disease Mother    • No Known Problems Father    • Malig Hyperthermia Neg Hx          Objective:  There were no vitals filed for this visit.      08/19/22  1341   Weight: 78.5 kg (173 lb)     Body mass index is 24.13 kg/m².        Ortho Exam   He is alert and oriented x3.  Again he has a lateral wound on the knee but it is a clean base.  He has some erythema to the  anterior part of his incision he states he had drainage from the inferior incisional area but there is none today.  No sign of an effusion in the knee joint itself I am not can aspirate.  The calf remains nontender.  He has good distal pulses.    Assessment:        1. Infection of right prepatellar bursa    2. Status post orthopedic surgery, follow-up exam           Plan: He is doing wound care changes per wound care center.  He is on the amoxicillin.  I refilled his Percocet.  Return to see me on Tuesday for recheck of the wound exam.  Patient was in agreement.  At this time I do not see any evidence of a septic arthritis or septic effusion in the knee joint itself            Work Status:    SUE query complete.    Orders:  No orders of the defined types were placed in this encounter.      Medications:  New Medications Ordered This Visit   Medications   • oxyCODONE-acetaminophen (PERCOCET) 5-325 MG per tablet     Sig: Take 1 tablet by mouth Every 8 (Eight) Hours As Needed for Moderate Pain  or Severe Pain .     Dispense:  36 tablet     Refill:  0       Followup:  Return in about 4 days (around 8/23/2022).          Dictated utilizing Dragon dictation

## 2022-08-22 ENCOUNTER — APPOINTMENT (OUTPATIENT)
Dept: WOUND CARE | Facility: HOSPITAL | Age: 32
End: 2022-08-22

## 2022-08-22 PROCEDURE — 97607 NEG PRS WND THR NDME<=50SQCM: CPT

## 2022-08-23 ENCOUNTER — APPOINTMENT (OUTPATIENT)
Dept: WOUND CARE | Facility: HOSPITAL | Age: 32
End: 2022-08-23

## 2022-08-23 ENCOUNTER — OFFICE VISIT (OUTPATIENT)
Dept: ORTHOPEDIC SURGERY | Facility: CLINIC | Age: 32
End: 2022-08-23

## 2022-08-23 VITALS — BODY MASS INDEX: 24.22 KG/M2 | HEIGHT: 71 IN | WEIGHT: 173 LBS

## 2022-08-23 DIAGNOSIS — Z09 STATUS POST ORTHOPEDIC SURGERY, FOLLOW-UP EXAM: ICD-10-CM

## 2022-08-23 DIAGNOSIS — M71.161 INFECTION OF RIGHT PREPATELLAR BURSA: Primary | ICD-10-CM

## 2022-08-23 PROCEDURE — 97607 NEG PRS WND THR NDME<=50SQCM: CPT

## 2022-08-23 PROCEDURE — 99024 POSTOP FOLLOW-UP VISIT: CPT | Performed by: ORTHOPAEDIC SURGERY

## 2022-08-23 NOTE — PROGRESS NOTES
Subjective: Status post excision of right prepatellar bursa.     Patient ID: Jonny Arellano is a 32 y.o. male.    Chief Complaint:    History of Present Illness patient is improving.  Wound care as per the wound VAC states it does reduce the swelling and the pain he can now elevate his leg and even flex the knee.       Social History     Occupational History   • Not on file   Tobacco Use   • Smoking status: Current Every Day Smoker     Packs/day: 0.50     Years: 8.00     Pack years: 4.00     Types: Cigarettes   • Smokeless tobacco: Never Used   • Tobacco comment: pt has smoked today   Vaping Use   • Vaping Use: Never used   Substance and Sexual Activity   • Alcohol use: Never     Comment: rarely   • Drug use: Defer   • Sexual activity: Yes      Review of Systems      Past Medical History:   Diagnosis Date   • Fracture, foot 2021   • Migraines    • MRSA (methicillin resistant staph aureus) culture positive 01/28/2022    left knee     Past Surgical History:   Procedure Laterality Date   • INCISION AND DRAINAGE LEG Right 07/15/2022    Procedure: PATELLA BURSA EXCISION;  Surgeon: Daniel Chris MD;  Location: [facility];  Service: Orthopedics;  Laterality: Right;   • KNEE SURGERY  07/15/2022   • LACERATION REPAIR Right 1990    index finger     Family History   Problem Relation Age of Onset   • Cancer Mother    • Crohn's disease Mother    • No Known Problems Father    • Malig Hyperthermia Neg Hx          Objective:  There were no vitals filed for this visit.      08/23/22  1356   Weight: 78.5 kg (173 lb)     Body mass index is 24.13 kg/m².        Ortho Exam   He is alert and oriented.  There is less edema and erythema to the knee can flex to probably 45 degrees.  Calf remains nontender.    Assessment:        1. Infection of right prepatellar bursa    2. Status post orthopedic surgery, follow-up exam           Plan: Continue wound care center treatment.  Continue the antibiotics.  Return to see me 1 week for follow-up  visit.            Work Status:    SUE query complete.    Orders:  No orders of the defined types were placed in this encounter.      Medications:  No orders of the defined types were placed in this encounter.      Followup:  Return in about 1 week (around 8/30/2022).          Dictated utilizing Dragon dictation   Answers for HPI/ROS submitted by the patient on 8/21/2022  What is the primary reason for your visit?: Other  Please describe your symptoms.: Post op follow up  Have you had these symptoms before?: No  How long have you been having these symptoms?: Greater than 2 weeks

## 2022-08-25 ENCOUNTER — APPOINTMENT (OUTPATIENT)
Dept: WOUND CARE | Facility: HOSPITAL | Age: 32
End: 2022-08-25

## 2022-08-25 ENCOUNTER — HOSPITAL ENCOUNTER (OUTPATIENT)
Dept: PHYSICAL THERAPY | Facility: HOSPITAL | Age: 32
Setting detail: THERAPIES SERIES
Discharge: HOME | End: 2022-08-25

## 2022-08-25 DIAGNOSIS — M71.161 INFECTION OF RIGHT PREPATELLAR BURSA: Primary | ICD-10-CM

## 2022-08-25 PROCEDURE — 97162 PT EVAL MOD COMPLEX 30 MIN: CPT

## 2022-08-25 PROCEDURE — 97110 THERAPEUTIC EXERCISES: CPT
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
## 2022-08-29 ENCOUNTER — APPOINTMENT (OUTPATIENT)
Dept: WOUND CARE | Facility: HOSPITAL | Age: 32
End: 2022-08-29

## 2022-08-29 DIAGNOSIS — Z09 STATUS POST ORTHOPEDIC SURGERY, FOLLOW-UP EXAM: ICD-10-CM

## 2022-08-29 DIAGNOSIS — M71.161 INFECTION OF RIGHT PREPATELLAR BURSA: ICD-10-CM

## 2022-08-30 ENCOUNTER — OFFICE VISIT (OUTPATIENT)
Dept: ORTHOPEDIC SURGERY | Facility: CLINIC | Age: 32
End: 2022-08-30

## 2022-08-30 ENCOUNTER — HOSPITAL ENCOUNTER (OUTPATIENT)
Dept: PHYSICAL THERAPY | Facility: HOSPITAL | Age: 32
Setting detail: THERAPIES SERIES
Discharge: HOME | End: 2022-08-30

## 2022-08-30 ENCOUNTER — APPOINTMENT (OUTPATIENT)
Dept: WOUND CARE | Facility: HOSPITAL | Age: 32
End: 2022-08-30

## 2022-08-30 DIAGNOSIS — M71.161 INFECTION OF RIGHT PREPATELLAR BURSA: Primary | ICD-10-CM

## 2022-08-30 DIAGNOSIS — Z09 STATUS POST ORTHOPEDIC SURGERY, FOLLOW-UP EXAM: ICD-10-CM

## 2022-08-30 PROCEDURE — 99024 POSTOP FOLLOW-UP VISIT: CPT | Performed by: ORTHOPAEDIC SURGERY

## 2022-08-30 PROCEDURE — 97110 THERAPEUTIC EXERCISES: CPT

## 2022-08-30 PROCEDURE — 97607 NEG PRS WND THR NDME<=50SQCM: CPT

## 2022-08-30 RX ORDER — OXYCODONE HYDROCHLORIDE AND ACETAMINOPHEN 5; 325 MG/1; MG/1
1 TABLET ORAL EVERY 8 HOURS PRN
Qty: 24 TABLET | Refills: 0 | Status: SHIPPED | OUTPATIENT
Start: 2022-08-30 | End: 2022-09-07

## 2022-08-30 NOTE — PROGRESS NOTES
Subjective: Status post excision right prepatellar infected bursa skin slough     Patient ID: Jonny Arellano is a 32 y.o. male.    Chief Complaint:    History of Present Illness patient doing well.  Has a wound VAC to the right knee under the care of the wound care center.  Motion is improving has been involved in physical therapy.       Social History     Occupational History   • Not on file   Tobacco Use   • Smoking status: Current Every Day Smoker     Packs/day: 0.50     Years: 8.00     Pack years: 4.00     Types: Cigarettes   • Smokeless tobacco: Never Used   • Tobacco comment: pt has smoked today   Vaping Use   • Vaping Use: Never used   Substance and Sexual Activity   • Alcohol use: Never     Comment: rarely   • Drug use: Defer   • Sexual activity: Yes      Review of Systems      Past Medical History:   Diagnosis Date   • Fracture, foot 2021   • Migraines    • MRSA (methicillin resistant staph aureus) culture positive 01/28/2022    left knee     Past Surgical History:   Procedure Laterality Date   • INCISION AND DRAINAGE LEG Right 07/15/2022    Procedure: PATELLA BURSA EXCISION;  Surgeon: Daniel Chris MD;  Location: [facility];  Service: Orthopedics;  Laterality: Right;   • KNEE SURGERY  07/15/2022   • LACERATION REPAIR Right 1990    index finger     Family History   Problem Relation Age of Onset   • Cancer Mother    • Crohn's disease Mother    • No Known Problems Father    • Malig Hyperthermia Neg Hx          Objective:  There were no vitals filed for this visit.  There were no vitals filed for this visit.  There is no height or weight on file to calculate BMI.        Ortho Exam   Is alert and oriented.  Wound VAC is in place place.  Has 0 to 90/95 degrees of motion.  Calf is nontender.  Good distal pulses.    Assessment:        1. Infection of right prepatellar bursa    2. Status post orthopedic surgery, follow-up exam           Plan: Continue care per the wound care center.  Patient was told next time he  needs pain medicine we will switch to hydrocodone return in 2 weeks for follow-up.            Work Status:    SUE query complete.    Orders:  No orders of the defined types were placed in this encounter.      Medications:  No orders of the defined types were placed in this encounter.      Followup:  Return in about 2 weeks (around 9/13/2022).          Dictated utilizing Dragon dictation

## 2022-09-01 ENCOUNTER — DOCUMENTATION (OUTPATIENT)
Dept: PHYSICAL THERAPY | Facility: HOSPITAL | Age: 32
End: 2022-09-01

## 2022-09-01 ENCOUNTER — APPOINTMENT (OUTPATIENT)
Dept: PHYSICAL THERAPY | Facility: HOSPITAL | Age: 32
End: 2022-09-01

## 2022-09-02 ENCOUNTER — HOSPITAL ENCOUNTER (OUTPATIENT)
Dept: PHYSICAL THERAPY | Facility: HOSPITAL | Age: 32
Setting detail: THERAPIES SERIES
Discharge: HOME | End: 2022-09-02

## 2022-09-02 DIAGNOSIS — M71.161 INFECTION OF RIGHT PREPATELLAR BURSA: Primary | ICD-10-CM

## 2022-09-02 PROCEDURE — 97110 THERAPEUTIC EXERCISES: CPT

## 2022-09-02 NOTE — THERAPY TREATMENT NOTE
Outpatient Physical Therapy Ortho Treatment Note   Bridgett Simmons     Patient Name: Jonny Arellano  : 1990  MRN: [id number]  Today's Date: 2022      Visit Date: 2022    Visit Dx:    ICD-10-CM ICD-9-CM   1. Infection of right prepatellar bursa  M71.161 726.65       Patient Active Problem List   Diagnosis   • Infection of right prepatellar bursa        Past Medical History:   Diagnosis Date   • Fracture, foot    • Migraines    • MRSA (methicillin resistant staph aureus) culture positive 2022    left knee        Past Surgical History:   Procedure Laterality Date   • INCISION AND DRAINAGE LEG Right 07/15/2022    Procedure: PATELLA BURSA EXCISION;  Surgeon: Daniel Chris MD;  Location: [facility];  Service: Orthopedics;  Laterality: Right;   • KNEE SURGERY  07/15/2022   • LACERATION REPAIR Right     index finger        PT Ortho     Row Name 22 0800       Subjective Comments    Subjective Comments pt reports his knee is a little sore this morning - feels he may have over done HEP yesterday since he had to cancel his clinic appt  -       Precautions and Contraindications    Precautions/Limitations other (see comments)  wound/wound vac right knee  -          User Key  (r) = Recorded By, (t) = Taken By, (c) = Cosigned By    Initials Name Provider Type    Travis Stevenson PTA Physical Therapist Assistant                             PT Assessment/Plan     Row Name 22 0966          PT Assessment    Assessment Comments pt continues with increased AROM; continues with significant weakness (R) LE causing substitution to complete leg lifts - modifications made to HEP to decrease this  -            PT Plan    PT Plan Comments Cont perPOC, check response to exercise progression  -           User Key  (r) = Recorded By, (t) = Taken By, (c) = Cosigned By    Initials Name Provider Type    Travis Stevenson PTA Physical Therapist Assistant                   OP Exercises     Row  Name 09/02/22 0957 09/02/22 0900 09/02/22 0800       Subjective Comments    Subjective Comments -- -- pt reports his knee is a little sore this morning - feels he may have over done HEP yesterday since he had to cancel his clinic appt  -       Total Minutes    88304 - PT Therapeutic Exercise Minutes 50  - -- --       Exercise 1    Exercise Name 1 -- Heel slides  -     Cueing 1 -- Verbal;Tactile;Demo  -     Time 1 -- 5 min  -        Exercise 2    Exercise Name 2 -- Wall slides  -     Cueing 2 -- Verbal;Tactile;Demo  -     Time 2 -- 5 min  -        Exercise 3    Exercise Name 3 -- Hamstring stretch  -     Cueing 3 -- Verbal;Tactile;[facility]     Reps 3 -- 10  -     Time 3 -- 10 sec  -        Exercise 4    Exercise Name 4 -- Russian stim w/ QS  -     Cueing 4 -- Verbal;Tactile;[facility]     Sets 4 -- 10/10  -     Time 4 -- 10 min  -        Exercise 5    Exercise Name 5 -- SLR  -     Cueing 5 -- Verbal;Tactile;[facility]     Sets 5 -- 2  -     Reps 5 -- 10  -     Additional Comments -- pt unable to do SLR without hip IR and assistance - modified to hooklying march and able to do without assistance  -        Exercise 6    Exercise Name 6 -- sidelying hip ABD  -     Cueing 6 -- Verbal;Tactile;[facility]     Reps 6 -- 15  -        Exercise 7    Exercise Name 7 -- sidelying hip ADD  -     Cueing 7 -- Verbal;Tactile;[facility]     Reps 7 -- 10  -     Additional Comments -- pt to switch to ball squeeze at home if rritates knee  -        Exercise 8    Exercise Name 8 -- SAQ  - --    Cueing 8 -- Verbal;Tactile;[facility] --    Reps 8 -- 15  - --    Time 8 -- 5 sec  - --       Exercise 9    Exercise Name 9 -- TKE vs theraband  - --    Cueing 9 -- Verbal;Tactile;[facility] --    Reps 9 -- 15  - --    Time 9 -- 5 sec  - --    Additional Comments -- Red  - --          User Key  (r) = Recorded By, (t) = Taken By, (c) = Cosigned By    Initials Name Provider Type    Travis Stevenson  ALAN Mcintyre Physical Therapist Assistant                                 Therapy Education  Education Details: Written instruction of new ex's issued and reviewed along with alternatives methods if ex's are too painful/difficult  Given: HEP, Symptoms/condition management  Program: New, Reinforced, Modified  How Provided: Verbal, Demonstration, Written  Provided to: Patient  Level of Understanding: Teach back education performed, Verbalized, Demonstrated              Time Calculation:   Start Time: 0808  Stop Time: 0905  Time Calculation (min): 57 min  Timed Charges  97919 - PT Therapeutic Exercise Minutes: 50  Total Minutes  Timed Charges Total Minutes: 50   Total Minutes: 50  Therapy Charges for Today     Code Description Service Date Service Provider Modifiers Qty    16222182970 HC PT THER PROC EA 15 MIN 9/2/2022 Travis Jade PTA GP 3                    Travis Jade PTA  9/2/2022

## 2022-09-06 ENCOUNTER — HOSPITAL ENCOUNTER (OUTPATIENT)
Dept: PHYSICAL THERAPY | Facility: HOSPITAL | Age: 32
Setting detail: THERAPIES SERIES
Discharge: HOME | End: 2022-09-06

## 2022-09-06 ENCOUNTER — APPOINTMENT (OUTPATIENT)
Dept: WOUND CARE | Facility: HOSPITAL | Age: 32
End: 2022-09-06

## 2022-09-06 PROCEDURE — 97607 NEG PRS WND THR NDME<=50SQCM: CPT

## 2022-09-06 PROCEDURE — 97110 THERAPEUTIC EXERCISES: CPT

## 2022-09-06 NOTE — THERAPY TREATMENT NOTE
Outpatient Physical Therapy Ortho Treatment Note   Bridgett Simmons     Patient Name: Jonny Arellano  : 1990  MRN: [id number]  Today's Date: 2022      Visit Date: 2022    Visit Dx:  No diagnosis found.    Patient Active Problem List   Diagnosis   • Infection of right prepatellar bursa        Past Medical History:   Diagnosis Date   • Fracture, foot    • Migraines    • MRSA (methicillin resistant staph aureus) culture positive 2022    left knee        Past Surgical History:   Procedure Laterality Date   • INCISION AND DRAINAGE LEG Right 07/15/2022    Procedure: PATELLA BURSA EXCISION;  Surgeon: Daniel Chris MD;  Location: [facility];  Service: Orthopedics;  Laterality: Right;   • KNEE SURGERY  07/15/2022   • LACERATION REPAIR Right     index finger        PT Ortho     Row Name 22 1500       Right Lower Ext    Rt Knee Extension/Flexion AROM 128 degrees flexion, supine post stretches  -          User Key  (r) = Recorded By, (t) = Taken By, (c) = Cosigned By    Initials Name Provider Type     Travis Jade PTA Physical Therapist Assistant                             PT Assessment/Plan     Row Name 22 1622          PT Assessment    Assessment Comments pt making gains with AROM and tolerating progression of ex's; continues to have significant quad weakness and unable to perform SLR  -            PT Plan    PT Plan Comments Cont per POC, progressing as tolerated  -           User Key  (r) = Recorded By, (t) = Taken By, (c) = Cosigned By    Initials Name Provider Type    Travis Stevenson PTA Physical Therapist Assistant                   OP Exercises     Row Name 22 1628 22 1500          Total Minutes    17761 - PT Therapeutic Exercise Minutes 50  -MH --            Exercise 1    Exercise Name 1 -- Heel slides  -     Cueing 1 -- Verbal;Tactile;Demo  -     Time 1 -- 5 min  -            Exercise 2    Exercise Name 2 -- Wall slides  -     Cueing 2  "-- Verbal;Tactile;Demo  -     Time 2 -- 5 min  -            Exercise 3    Exercise Name 3 -- Hamstring stretch  -MH     Cueing 3 -- Verbal;Tactile;Demo  -     Reps 3 -- 10  -     Time 3 -- 10 sec  -            Exercise 4    Exercise Name 4 -- Russian stim w/ QS  -MH     Cueing 4 -- Verbal;Tactile;Demo  -     Sets 4 -- 10/10  -     Time 4 -- 10 min  -            Exercise 5    Exercise Name 5 -- SLR  -     Cueing 5 -- Verbal;Tactile;Demo  -     Sets 5 -- 2  -MH     Reps 5 -- 10 marches  -     Additional Comments -- 2 x 5 reps of assisted (mod/max) SLR; 5 reps of reverse SLR with assist  -            Exercise 6    Exercise Name 6 -- sidelying hip ABD  -     Cueing 6 -- Verbal;Tactile;Demo  -     Reps 6 -- 20  -MH            Exercise 7    Exercise Name 7 -- sidelying hip ADD  -MH     Cueing 7 -- Verbal;Tactile;Demo  -     Reps 7 -- 20  -MH            Exercise 8    Exercise Name 8 -- SAQ  -     Cueing 8 -- Verbal;Tactile;Demo  -     Reps 8 -- 20  -     Time 8 -- 5 sec  -            Exercise 9    Exercise Name 9 -- TKE vs theraband  -     Cueing 9 -- Verbal;Tactile;Demo  -     Reps 9 -- 20  -     Time 9 -- 5 sec  -     Additional Comments -- blue  -            Exercise 10    Exercise Name 10 -- Partial squat  -     Cueing 10 -- Verbal;Tactile;Demo  -     Reps 10 -- 15  -MH            Exercise 11    Exercise Name 11 -- 4\" forward step up  -     Cueing 11 -- Verbal;Demo  -     Reps 11 -- 15 each  -           User Key  (r) = Recorded By, (t) = Taken By, (c) = Cosigned By    Initials Name Provider Type    Travis Stevenson, PTA Physical Therapist Assistant                                 Therapy Education  Given: HEP, Symptoms/condition management  Program: Reinforced, Progressed  How Provided: Verbal, Demonstration  Provided to: Patient  Level of Understanding: Teach back education performed, Verbalized, Demonstrated              Time Calculation:   Start Time: " 1456  Stop Time: 1558  Time Calculation (min): 62 min  Timed Charges  93220 - PT Therapeutic Exercise Minutes: 50  Total Minutes  Timed Charges Total Minutes: 50   Total Minutes: 50  Therapy Charges for Today     Code Description Service Date Service Provider Modifiers Qty    86470701152 HC PT THER PROC EA 15 MIN 9/6/2022 Travis Jade, PTA GP 3                    Travis Jade, ALAN  9/6/2022

## 2022-09-07 ENCOUNTER — TELEPHONE (OUTPATIENT)
Dept: ORTHOPEDIC SURGERY | Facility: CLINIC | Age: 32
End: 2022-09-07

## 2022-09-07 DIAGNOSIS — Z09 STATUS POST ORTHOPEDIC SURGERY, FOLLOW-UP EXAM: ICD-10-CM

## 2022-09-07 DIAGNOSIS — M71.161 INFECTION OF RIGHT PREPATELLAR BURSA: Primary | ICD-10-CM

## 2022-09-07 DIAGNOSIS — M71.161 INFECTION OF RIGHT PREPATELLAR BURSA: ICD-10-CM

## 2022-09-07 RX ORDER — OXYCODONE HYDROCHLORIDE AND ACETAMINOPHEN 5; 325 MG/1; MG/1
1 TABLET ORAL EVERY 8 HOURS PRN
Qty: 24 TABLET | Refills: 0 | Status: CANCELLED | OUTPATIENT
Start: 2022-09-07

## 2022-09-07 RX ORDER — HYDROCODONE BITARTRATE AND ACETAMINOPHEN 7.5; 325 MG/1; MG/1
1 TABLET ORAL EVERY 6 HOURS PRN
Qty: 36 TABLET | Refills: 0 | Status: SHIPPED | OUTPATIENT
Start: 2022-09-07 | End: 2022-09-15 | Stop reason: SDUPTHER

## 2022-09-07 NOTE — TELEPHONE ENCOUNTER
Caller: PATIENT    Requested Prescriptions:   Requested Prescriptions     Pending Prescriptions Disp Refills   • oxyCODONE-acetaminophen (PERCOCET) 5-325 MG per tablet 24 tablet 0     Sig: Take 1 tablet by mouth Every 8 (Eight) Hours As Needed for Moderate Pain or Severe Pain.        Pharmacy where request should be sent:  [facility]    Additional details provided by patient:  PAIN MED REFILL REQUEST 09/07/2022 PATIENT CALLED TO ADVISE IS OUT OF PAIN MEDS AND PER DR SALEEM (PER THE PATIENT)  WAS GOING TO D/C THE OXYCODONE AND BE CHANGED TO SOMETHING ELSE.  HUB ADVISED WOULD NOTIFY CLINICAL AND SOMEONE WOULD CALL HIM BACK.  STILL USES [facility].         Does the patient have less than a 3 day supply:  [x] Yes  [] No    Paz Aguilar Rep   09/07/22 11:50 EDT

## 2022-09-07 NOTE — TELEPHONE ENCOUNTER
Please see note below patient is requesting pain medication per the last note:    08.30.2022  Plan: Continue care per the wound care center.  Patient was told next time he needs pain medicine we will switch to hydrocodone return in 2 weeks for follow-up.     Status post excision of right prepatellar bursa-07/15/2022.    Please advise.

## 2022-09-08 ENCOUNTER — DOCUMENTATION (OUTPATIENT)
Dept: PHYSICAL THERAPY | Facility: HOSPITAL | Age: 32
End: 2022-09-08

## 2022-09-08 ENCOUNTER — APPOINTMENT (OUTPATIENT)
Dept: PHYSICAL THERAPY | Facility: HOSPITAL | Age: 32
End: 2022-09-08

## 2022-09-13 ENCOUNTER — HOSPITAL ENCOUNTER (OUTPATIENT)
Dept: PHYSICAL THERAPY | Facility: HOSPITAL | Age: 32
Setting detail: THERAPIES SERIES
Discharge: HOME | End: 2022-09-13

## 2022-09-13 ENCOUNTER — APPOINTMENT (OUTPATIENT)
Dept: WOUND CARE | Facility: HOSPITAL | Age: 32
End: 2022-09-13

## 2022-09-13 ENCOUNTER — OFFICE VISIT (OUTPATIENT)
Dept: ORTHOPEDIC SURGERY | Facility: CLINIC | Age: 32
End: 2022-09-13

## 2022-09-13 VITALS — HEIGHT: 71 IN | WEIGHT: 173 LBS | BODY MASS INDEX: 24.22 KG/M2

## 2022-09-13 DIAGNOSIS — Z09 STATUS POST ORTHOPEDIC SURGERY, FOLLOW-UP EXAM: ICD-10-CM

## 2022-09-13 DIAGNOSIS — M71.161 INFECTION OF RIGHT PREPATELLAR BURSA: Primary | ICD-10-CM

## 2022-09-13 PROCEDURE — 97607 NEG PRS WND THR NDME<=50SQCM: CPT

## 2022-09-13 PROCEDURE — 99024 POSTOP FOLLOW-UP VISIT: CPT | Performed by: ORTHOPAEDIC SURGERY

## 2022-09-13 PROCEDURE — 97110 THERAPEUTIC EXERCISES: CPT

## 2022-09-13 NOTE — PROGRESS NOTES
Subjective: Status post excision of right prepatellar infected bursa     Patient ID: Jonny Arellano is a 32 y.o. male.    Chief Complaint:    History of Present Illness patient 2 months.  He is under the care of the wound care center.  Wound VAC is in place.  Is able to ambulate though without the knee immobilizer and is gaining strength he states in his quadricep muscle.  His calf remains nontender       Social History     Occupational History   • Not on file   Tobacco Use   • Smoking status: Current Every Day Smoker     Packs/day: 0.50     Years: 8.00     Pack years: 4.00     Types: Cigarettes   • Smokeless tobacco: Never Used   • Tobacco comment: pt has smoked today   Vaping Use   • Vaping Use: Never used   Substance and Sexual Activity   • Alcohol use: Never     Comment: rarely   • Drug use: Defer   • Sexual activity: Yes      Review of Systems   Constitutional: Negative for chills, diaphoresis, fever and unexpected weight change.   HENT: Negative for hearing loss, nosebleeds, sore throat and tinnitus.    Eyes: Negative for pain and visual disturbance.   Respiratory: Negative for cough, shortness of breath and wheezing.    Cardiovascular: Negative for chest pain and palpitations.   Gastrointestinal: Negative for abdominal pain, diarrhea, nausea and vomiting.   Endocrine: Negative for cold intolerance, heat intolerance and polydipsia.   Genitourinary: Negative for difficulty urinating, dysuria and hematuria.   Musculoskeletal: Positive for arthralgias, joint swelling and myalgias.   Skin: Negative for rash and wound.   Allergic/Immunologic: Negative for environmental allergies.   Neurological: Negative for dizziness, syncope and numbness.   Hematological: Does not bruise/bleed easily.   Psychiatric/Behavioral: Negative for dysphoric mood and sleep disturbance. The patient is not nervous/anxious.          Past Medical History:   Diagnosis Date   • Fracture, foot 2021   • Migraines    • MRSA (methicillin  resistant staph aureus) culture positive 01/28/2022    left knee     Past Surgical History:   Procedure Laterality Date   • INCISION AND DRAINAGE LEG Right 07/15/2022    Procedure: PATELLA BURSA EXCISION;  Surgeon: Daniel Chris MD;  Location: [facility];  Service: Orthopedics;  Laterality: Right;   • KNEE SURGERY  07/15/2022   • LACERATION REPAIR Right 1990    index finger     Family History   Problem Relation Age of Onset   • Cancer Mother    • Crohn's disease Mother    • No Known Problems Father    • Malig Hyperthermia Neg Hx          Objective:  There were no vitals filed for this visit.      09/13/22  1049   Weight: 78.5 kg (173 lb)     Body mass index is 24.13 kg/m².        Ortho Exam   Continue wound care per the wound care center.  No motor or sensory deficit.  Calf is nontender    Assessment:        1. Infection of right prepatellar bursa    2. Status post orthopedic surgery, follow-up exam           Plan: Continue weightbearing as tolerated.  I told him to wean him off the pain medication over the course of the next 3 to 4 weeks.  Return to see me in 4 weeks.  Answered all questions            Work Status:    SUE query complete.    Orders:  No orders of the defined types were placed in this encounter.      Medications:  No orders of the defined types were placed in this encounter.      Followup:  Return in about 4 weeks (around 10/11/2022).          Dictated utilizing Dragon dictation

## 2022-09-13 NOTE — THERAPY TREATMENT NOTE
Outpatient Physical Therapy Ortho Treatment Note   Bridgett Simmons     Patient Name: Jonny Arellano  : 1990  MRN: [id number]  Today's Date: 2022      Visit Date: 2022    Visit Dx:    ICD-10-CM ICD-9-CM   1. Infection of right prepatellar bursa  M71.161 726.65       Patient Active Problem List   Diagnosis   • Infection of right prepatellar bursa        Past Medical History:   Diagnosis Date   • Fracture, foot    • Migraines    • MRSA (methicillin resistant staph aureus) culture positive 2022    left knee        Past Surgical History:   Procedure Laterality Date   • INCISION AND DRAINAGE LEG Right 07/15/2022    Procedure: PATELLA BURSA EXCISION;  Surgeon: Daniel Chris MD;  Location: [facility];  Service: Orthopedics;  Laterality: Right;   • KNEE SURGERY  07/15/2022   • LACERATION REPAIR Right     index finger        PT Ortho     Row Name 22 0800       Subjective Comments    Subjective Comments Pt reports missing last week because he didn't realize what day it was; pt feels he is maintaining good motion and his gait if feeling a little more normal; reports his quad strength is improving much slower than he would like  -       Precautions and Contraindications    Precautions/Limitations other (see comments)  wound/wound vac right knee  -          User Key  (r) = Recorded By, (t) = Taken By, (c) = Cosigned By    Initials Name Provider Type     Travis Jade, PTA Physical Therapist Assistant                             PT Assessment/Plan     Row Name 22 0908          PT Assessment    Assessment Comments pt able to complete (I) SLR this session - cues still required to not substitute with transfers/transitional movements; maintain good AROM and reporting gait feeling more normal  -            PT Plan    PT Plan Comments Cont per POC, progressing as tolerated  -           User Key  (r) = Recorded By, (t) = Taken By, (c) = Cosigned By    Initials Name Provider Type     Travis Stevenson, PTA Physical Therapist Assistant                   OP Exercises     Row Name 09/13/22 0910 09/13/22 0800          Subjective Comments    Subjective Comments -- Pt reports missing last week because he didn't realize what day it was; pt feels he is maintaining good motion and his gait if feeling a little more normal; reports his quad strength is improving much slower than he would like  -            Total Minutes    09572 - PT Therapeutic Exercise Minutes 50  -MH --            Exercise 1    Exercise Name 1 -- Heel slides  -     Cueing 1 -- Verbal;Tactile;Demo  -     Time 1 -- 5 min  -            Exercise 2    Exercise Name 2 -- Wall slides  -     Cueing 2 -- Verbal;Tactile;Demo  -     Time 2 -- 5 min  -            Exercise 3    Exercise Name 3 -- Hamstring stretch  -     Cueing 3 -- Verbal;Tactile;Demo  -     Reps 3 -- 10  -     Time 3 -- 10 sec  -            Exercise 4    Exercise Name 4 -- Russian stim w/ QS  -     Cueing 4 -- Verbal;Tactile;Demo  -     Sets 4 -- 10/10  -     Time 4 -- 10 min  -            Exercise 5    Exercise Name 5 -- SLR  -     Cueing 5 -- Verbal;Tactile;Demo  -     Sets 5 -- 2  -MH     Reps 5 -- 15 marches  -     Additional Comments -- Pt able to do 2 x 5 reps of SLR (I) after assist to initate first lift  -            Exercise 6    Exercise Name 6 -- sidelying hip ABD  -     Cueing 6 -- Verbal;Tactile;Demo  -     Reps 6 -- 20  -            Exercise 7    Exercise Name 7 -- sidelying hip ADD  -     Cueing 7 -- Verbal;Tactile;Demo  -     Reps 7 -- 20  -            Exercise 8    Exercise Name 8 -- SAQ  -     Cueing 8 -- Verbal;Tactile;Demo  -     Reps 8 -- 20  -     Time 8 -- 5 sec  -            Exercise 9    Exercise Name 9 -- TKE vs theraband  -     Cueing 9 -- Verbal;Tactile;Demo  -     Reps 9 -- 30  -     Time 9 -- 5 sec  -     Additional Comments -- blue  -            Exercise 10    Exercise Name 10 --  "Partial squat  -     Cueing 10 -- Verbal;Tactile;Demo  -     Reps 10 -- 15  -            Exercise 11    Exercise Name 11 -- 6\" forward step up  -     Cueing 11 -- Verbal;Demo  -     Reps 11 -- 10 each  -           User Key  (r) = Recorded By, (t) = Taken By, (c) = Cosigned By    Initials Name Provider Type     Travis Jade PTA Physical Therapist Assistant                                 Therapy Education  Given: HEP, Symptoms/condition management  Program: Reinforced, Progressed  How Provided: Verbal, Demonstration  Provided to: Patient  Level of Understanding: Teach back education performed, Verbalized, Demonstrated              Time Calculation:   Start Time: 0804  Stop Time: 0859  Time Calculation (min): 55 min  Timed Charges  45986 - PT Therapeutic Exercise Minutes: 50  Total Minutes  Timed Charges Total Minutes: 50   Total Minutes: 50  Therapy Charges for Today     Code Description Service Date Service Provider Modifiers Qty    12990855972 HC PT THER PROC EA 15 MIN 9/13/2022 Travis Jade PTA GP 3                    Travis Jade PTA  9/13/2022     "

## 2022-09-15 ENCOUNTER — APPOINTMENT (OUTPATIENT)
Dept: PHYSICAL THERAPY | Facility: HOSPITAL | Age: 32
End: 2022-09-15

## 2022-09-15 DIAGNOSIS — M71.161 INFECTION OF RIGHT PREPATELLAR BURSA: ICD-10-CM

## 2022-09-15 DIAGNOSIS — Z09 STATUS POST ORTHOPEDIC SURGERY, FOLLOW-UP EXAM: ICD-10-CM

## 2022-09-15 RX ORDER — HYDROCODONE BITARTRATE AND ACETAMINOPHEN 7.5; 325 MG/1; MG/1
1 TABLET ORAL EVERY 6 HOURS PRN
Qty: 24 TABLET | Refills: 0 | Status: SHIPPED | OUTPATIENT
Start: 2022-09-15 | End: 2022-09-22

## 2022-09-15 RX ORDER — HYDROCODONE BITARTRATE AND ACETAMINOPHEN 7.5; 325 MG/1; MG/1
1 TABLET ORAL EVERY 6 HOURS PRN
Qty: 36 TABLET | Refills: 0 | Status: CANCELLED | OUTPATIENT
Start: 2022-09-15

## 2022-09-15 NOTE — TELEPHONE ENCOUNTER
Rx Refill Note  Requested Prescriptions     Pending Prescriptions Disp Refills    HYDROcodone-acetaminophen (Norco) 7.5-325 MG per tablet 36 tablet 0     Sig: Take 1 tablet by mouth Every 6 (Six) Hours As Needed for Moderate Pain or Severe Pain.      Last office visit with prescribing clinician: 9/13/2022      Next office visit with prescribing clinician: 10/11/2022   Last Filled:09.07.2022    Encounter Diagnoses   Name Primary?    Infection of right prepatellar bursa     Status post orthopedic surgery, follow-up exam             Mame Cordero MA  09/15/22, 12:27 EDT    {TIP  Encounters:    {TIP  Please add Last Relevant Lab Date if appropriate:  {TIP  Is Refill Pharmacy correct?:

## 2022-09-15 NOTE — TELEPHONE ENCOUNTER
Caller: YONNY MONTANO    Relationship: SELF    Best call back number: [phone]  What medications are you currently taking:   HYDROCODNE 7.5-325       When did you start taking these medications: APPROX MONTH AND 1/23 AGO FROM KNEE SX    Which medication are you concerned about: HYDROCODONE 7.5-325    Who prescribed you this medication:DR SALEEM    What are your concerns: LESS THAN 3 DAYS LEFT // [facility]

## 2022-09-16 ENCOUNTER — HOSPITAL ENCOUNTER (OUTPATIENT)
Dept: PHYSICAL THERAPY | Facility: HOSPITAL | Age: 32
Setting detail: THERAPIES SERIES
Discharge: HOME | End: 2022-09-16

## 2022-09-16 PROCEDURE — 97110 THERAPEUTIC EXERCISES: CPT

## 2022-09-16 NOTE — THERAPY TREATMENT NOTE
"    Outpatient Physical Therapy Ortho Treatment Note   [facility]     Patient Name: Jonny Arellano  : 1990  MRN: [id number]  Today's Date: 2022      Visit Date: 2022    Visit Dx:  No diagnosis found.    Patient Active Problem List   Diagnosis   • Infection of right prepatellar bursa        Past Medical History:   Diagnosis Date   • Fracture, foot    • Migraines    • MRSA (methicillin resistant staph aureus) culture positive 2022    left knee        Past Surgical History:   Procedure Laterality Date   • INCISION AND DRAINAGE LEG Right 07/15/2022    Procedure: PATELLA BURSA EXCISION;  Surgeon: Daniel Chris MD;  Location: [facility];  Service: Orthopedics;  Laterality: Right;   • KNEE SURGERY  07/15/2022   • LACERATION REPAIR Right     index finger        PT Ortho     Row Name 22 1000       Subjective Comments    Subjective Comments pt states his knee is doing fairly well and has been doing ex's at home but has been struggling with nerve pain the past couple of days that getting to him and keeping him from being able to sleep  -       Precautions and Contraindications    Precautions/Limitations other (see comments)  wound/wound vac right knee  -          User Key  (r) = Recorded By, (t) = Taken By, (c) = Cosigned By    Initials Name Provider Type     Travis Jade PTA Physical Therapist Assistant                             PT Assessment/Plan     Row Name 22 1125          PT Assessment    Assessment Comments pt doing well in regards to knee pain and improving strength - pt is now able to do multiple reps of (I) SLR; pt biggest complaint today is \"nerve\" pain in (R) LE  -            PT Plan    PT Plan Comments Cont per POC, progress as tolerated; pt to progress HEP as in clinic  -           User Key  (r) = Recorded By, (t) = Taken By, (c) = Cosigned By    Initials Name Provider Type    Travis Stevenson PTA Physical Therapist Assistant                   OP " "Exercises     Row Name 09/16/22 1127 09/16/22 1000          Subjective Comments    Subjective Comments -- pt states his knee is doing fairly well and has been doing ex's at home but has been struggling with nerve pain the past couple of days that getting to him and keeping him from being able to sleep  -            Total Minutes    94146 - PT Therapeutic Exercise Minutes 53  -MH --            Exercise 1    Exercise Name 1 -- Heel slides  -     Cueing 1 -- Verbal;Tactile;Demo  -     Time 1 -- 5 min  -            Exercise 2    Exercise Name 2 -- Wall slides  -     Cueing 2 -- Verbal;Tactile;Demo  -     Time 2 -- 5 min  -            Exercise 3    Exercise Name 3 -- Hamstring stretch  -     Cueing 3 -- Verbal;Tactile;Critical access hospital     Reps 3 -- 10  -     Time 3 -- 10 sec  -            Exercise 4    Exercise Name 4 -- Russian stim w/ QS  -     Cueing 4 -- Verbal;Tactile;Critical access hospital     Sets 4 -- 10/10  -     Time 4 -- 10 min  -            Exercise 5    Exercise Name 5 -- SLR  -     Cueing 5 -- Verbal;Tactile;Critical access hospital     Sets 5 -- 2  -     Reps 5 -- 15 marches  -     Additional Comments -- 30 reps (I) SLR  -            Exercise 6    Exercise Name 6 -- sidelying hip ABD  -     Cueing 6 -- Verbal;Tactile;Demo  -     Reps 6 -- 30  -            Exercise 7    Exercise Name 7 -- sidelying hip ADD  -     Cueing 7 -- Verbal;Tactile;Critical access hospital     Reps 7 -- 30  -            Exercise 8    Exercise Name 8 -- SAQ  -     Cueing 8 -- Verbal;Tactile;Critical access hospital     Reps 8 -- 30  -     Time 8 -- 5 sec  -            Exercise 9    Exercise Name 9 -- TKE vs theraband  -     Cueing 9 -- Verbal;Tactile;Critical access hospital     Reps 9 -- 30  -     Time 9 -- 5 sec  -     Additional Comments -- black  -            Exercise 10    Exercise Name 10 -- Partial squat  -     Cueing 10 -- Verbal;Tactile;Critical access hospital     Reps 10 -- 20  -            Exercise 11    Exercise Name 11 -- 6\" forward step up  -     " Cueing 11 -- Verbal;Demo  -     Reps 11 -- 15 each  -           User Key  (r) = Recorded By, (t) = Taken By, (c) = Cosigned By    Initials Name Provider Type     Travis Jade PTA Physical Therapist Assistant                                 Therapy Education  Education Details: pt to hold gwen with HEP now that he is (I) with SLR; black theraband issued for home  Given: HEP, Symptoms/condition management  Program: Reinforced, Progressed  How Provided: Verbal, Demonstration  Provided to: Patient  Level of Understanding: Teach back education performed, Verbalized, Demonstrated              Time Calculation:   Start Time: 0956  Stop Time: 1058  Time Calculation (min): 62 min  Timed Charges  63485 - PT Therapeutic Exercise Minutes: 53  Total Minutes  Timed Charges Total Minutes: 53   Total Minutes: 53  Therapy Charges for Today     Code Description Service Date Service Provider Modifiers Qty    96787069483 HC PT THER PROC EA 15 MIN 9/16/2022 Travis Jade PTA GP 4                    Travis Jade PTA  9/16/2022

## 2022-09-19 ENCOUNTER — APPOINTMENT (OUTPATIENT)
Dept: WOUND CARE | Facility: HOSPITAL | Age: 32
End: 2022-09-19

## 2022-09-19 PROCEDURE — 97605 NEG PRS WND THER DME<=50SQCM: CPT

## 2022-09-20 ENCOUNTER — APPOINTMENT (OUTPATIENT)
Dept: PHYSICAL THERAPY | Facility: HOSPITAL | Age: 32
End: 2022-09-20

## 2022-09-20 ENCOUNTER — DOCUMENTATION (OUTPATIENT)
Dept: PHYSICAL THERAPY | Facility: HOSPITAL | Age: 32
End: 2022-09-20

## 2022-09-20 NOTE — SIGNIFICANT NOTE
Pt cancelled today's appt stating he is taking his mother to an MD appt. Pt is scheduled for later in the week.

## 2022-09-21 ENCOUNTER — HOSPITAL ENCOUNTER (OUTPATIENT)
Dept: PHYSICAL THERAPY | Facility: HOSPITAL | Age: 32
Setting detail: THERAPIES SERIES
Discharge: HOME | End: 2022-09-21

## 2022-09-21 DIAGNOSIS — M71.161 INFECTION OF RIGHT PREPATELLAR BURSA: Primary | ICD-10-CM

## 2022-09-21 PROCEDURE — 97110 THERAPEUTIC EXERCISES: CPT

## 2022-09-21 NOTE — THERAPY TREATMENT NOTE
Outpatient Physical Therapy Ortho Treatment Note   [facility]     Patient Name: Jonny Arellano  : 1990  MRN: [id number]  Today's Date: 2022      Visit Date: 2022    Visit Dx:    ICD-10-CM ICD-9-CM   1. Infection of right prepatellar bursa  M71.161 726.65       Patient Active Problem List   Diagnosis   • Infection of right prepatellar bursa        Past Medical History:   Diagnosis Date   • Fracture, foot    • Migraines    • MRSA (methicillin resistant staph aureus) culture positive 2022    left knee        Past Surgical History:   Procedure Laterality Date   • INCISION AND DRAINAGE LEG Right 07/15/2022    Procedure: PATELLA BURSA EXCISION;  Surgeon: Daniel Chris MD;  Location: [facility];  Service: Orthopedics;  Laterality: Right;   • KNEE SURGERY  07/15/2022   • LACERATION REPAIR Right     index finger        PT Ortho     Row Name 22 1205       Right Lower Ext    Rt Knee Extension/Flexion AROM 140 degrees flexion  -KM    Rt Knee Extension/Flexion PROM supine AA with sheet  -KM          User Key  (r) = Recorded By, (t) = Taken By, (c) = Cosigned By    Initials Name Provider Type    Yadi Kiran PTA Physical Therapist Assistant                             PT Assessment/Plan     Row Name 22 1205          PT Assessment    Assessment Comments Pt tolerated progression of strengthening well. Weakness noted with lateral dips.  -KM            PT Plan    PT Plan Comments Continue to progress as tolerated  -KM           User Key  (r) = Recorded By, (t) = Taken By, (c) = Cosigned By    Initials Name Provider Type    Yadi Kiran PTA Physical Therapist Assistant                   OP Exercises     Row Name 22 1205             Subjective Comments    Subjective Comments Pt states his knee buckled this morning and bent further than he was ready for. States he had to rapplied the seal to medial aspect of knee and is currently sore. Pt to see wound doctor tomorrow   "-KM              Exercise 1    Exercise Name 1 Heel slides  -KM      Cueing 1 Verbal;Tactile;Demo  -KM      Time 1 5 min  -KM              Exercise 2    Exercise Name 2 Wall slides  -KM      Cueing 2 Verbal;Tactile;Demo  -KM      Time 2 --  -KM              Exercise 3    Exercise Name 3 Hamstring stretch  -KM      Cueing 3 Verbal;Tactile;Demo  -KM      Reps 3 10  -KM      Time 3 10 sec  -KM              Exercise 4    Exercise Name 4 Russian stim w/ QS  -KM      Cueing 4 Verbal;Tactile;Demo  -KM      Sets 4 10/10  -KM      Time 4 10 min  -KM              Exercise 5    Exercise Name 5 SLR  -KM      Cueing 5 Verbal;Tactile;Demo  -KM      Sets 5 --  -KM      Reps 5 30  -KM              Exercise 6    Exercise Name 6 sidelying hip ABD  -KM      Cueing 6 Verbal;Tactile;Demo  -KM      Reps 6 30  -KM              Exercise 7    Exercise Name 7 sidelying hip ADD  -KM      Cueing 7 Verbal;Tactile;Demo  -KM      Reps 7 30  -KM              Exercise 8    Exercise Name 8 SAQ  -KM      Cueing 8 Verbal;Tactile;Demo  -KM      Reps 8 40  -KM      Time 8 5 sec  -KM              Exercise 9    Exercise Name 9 TKE vs theraband  -KM      Cueing 9 Verbal;Tactile;Demo  -KM      Reps 9 40  -KM      Time 9 5 sec  -KM      Additional Comments silver  -KM              Exercise 10    Exercise Name 10 Partial squat  -KM      Cueing 10 Verbal;Tactile;Demo  -KM      Reps 10 25  -KM              Exercise 11    Exercise Name 11 6\" forward step up  -KM      Cueing 11 Verbal;Demo  -KM      Reps 11 25 each  -KM              Exercise 12    Exercise Name 12 LAQ  -KM      Reps 12 30  -KM              Exercise 13    Exercise Name 13 2\" Lateral Dips  -KM      Reps 13 25  -KM            User Key  (r) = Recorded By, (t) = Taken By, (c) = Cosigned By    Initials Name Provider Type    Yadi Kiran, PTA Physical Therapist Assistant                                                Time Calculation:   Start Time: 1205  Stop Time: 1250  Time Calculation (min): 45 " min  Therapy Charges for Today     Code Description Service Date Service Provider Modifiers Qty    78930866472 HC PT THER PROC EA 15 MIN 9/21/2022 Yadi Mcarthur, PTA GP 2                    Yadi Mcarthur PTA  9/21/2022

## 2022-09-22 ENCOUNTER — APPOINTMENT (OUTPATIENT)
Dept: WOUND CARE | Facility: HOSPITAL | Age: 32
End: 2022-09-22

## 2022-09-22 ENCOUNTER — APPOINTMENT (OUTPATIENT)
Dept: PHYSICAL THERAPY | Facility: HOSPITAL | Age: 32
End: 2022-09-22

## 2022-09-22 ENCOUNTER — TELEPHONE (OUTPATIENT)
Dept: ORTHOPEDIC SURGERY | Facility: CLINIC | Age: 32
End: 2022-09-22

## 2022-09-22 ENCOUNTER — DOCUMENTATION (OUTPATIENT)
Dept: PHYSICAL THERAPY | Facility: HOSPITAL | Age: 32
End: 2022-09-22

## 2022-09-22 DIAGNOSIS — M71.161 INFECTION OF RIGHT PREPATELLAR BURSA: Primary | ICD-10-CM

## 2022-09-22 DIAGNOSIS — Z09 STATUS POST ORTHOPEDIC SURGERY, FOLLOW-UP EXAM: ICD-10-CM

## 2022-09-22 DIAGNOSIS — M71.161 INFECTION OF RIGHT PREPATELLAR BURSA: ICD-10-CM

## 2022-09-22 PROCEDURE — 97607 NEG PRS WND THR NDME<=50SQCM: CPT

## 2022-09-22 RX ORDER — TRAMADOL HYDROCHLORIDE 50 MG/1
50 TABLET ORAL EVERY 6 HOURS PRN
Qty: 30 TABLET | Refills: 0 | Status: SHIPPED | OUTPATIENT
Start: 2022-09-22

## 2022-09-22 RX ORDER — HYDROCODONE BITARTRATE AND ACETAMINOPHEN 7.5; 325 MG/1; MG/1
1 TABLET ORAL EVERY 6 HOURS PRN
Qty: 24 TABLET | Refills: 0 | Status: CANCELLED | OUTPATIENT
Start: 2022-09-22

## 2022-09-22 NOTE — SIGNIFICANT NOTE
Pt cancelled and rescheduled for next week - wound vac dressing came off during the night and he is going to wound care center.

## 2022-09-22 NOTE — TELEPHONE ENCOUNTER
Caller: YONNY     Relationship: SELF     Best call back number: [phone]    Requested Prescriptions:   Requested Prescriptions     Pending Prescriptions Disp Refills   • HYDROcodone-acetaminophen (Norco) 7.5-325 MG per tablet 24 tablet 0     Sig: Take 1 tablet by mouth Every 6 (Six) Hours As Needed for Moderate Pain or Severe Pain.        Pharmacy where request should be sent: [facility] [address] [place] [address] [phone] [facility] [phone] FX     Does the patient have less than a 3 day supply:  [x] Yes  [] No

## 2022-09-22 NOTE — TELEPHONE ENCOUNTER
Rx Refill Note  Requested Prescriptions     Pending Prescriptions Disp Refills    HYDROcodone-acetaminophen (Norco) 7.5-325 MG per tablet 24 tablet 0     Sig: Take 1 tablet by mouth Every 6 (Six) Hours As Needed for Moderate Pain or Severe Pain.      Last office visit with prescribing clinician: 9/13/2022      Next office visit with prescribing clinician: 10/11/2022   Last Filled:09.15.2022    Encounter Diagnoses   Name Primary?    Infection of right prepatellar bursa     Status post orthopedic surgery, follow-up exam       Date of Surgery:07/15/2022      Mame Cordero MA  09/22/22, 09:00 EDT    {TIP  Encounters:    {TIP  Please add Last Relevant Lab Date if appropriate:  {TIP  Is Refill Pharmacy correct?:

## 2022-09-26 ENCOUNTER — APPOINTMENT (OUTPATIENT)
Dept: WOUND CARE | Facility: HOSPITAL | Age: 32
End: 2022-09-26

## 2022-09-26 ENCOUNTER — LAB REQUISITION (OUTPATIENT)
Dept: LAB | Facility: HOSPITAL | Age: 32
End: 2022-09-26

## 2022-09-26 DIAGNOSIS — S81.001A UNSPECIFIED OPEN WOUND, RIGHT KNEE, INITIAL ENCOUNTER: ICD-10-CM

## 2022-09-26 DIAGNOSIS — F17.210 NICOTINE DEPENDENCE, CIGARETTES, UNCOMPLICATED: ICD-10-CM

## 2022-09-26 DIAGNOSIS — M70.41 PREPATELLAR BURSITIS, RIGHT KNEE: ICD-10-CM

## 2022-09-26 DIAGNOSIS — T81.31XA DISRUPTION OF EXTERNAL OPERATION (SURGICAL) WOUND, NOT ELSEWHERE CLASSIFIED, INITIAL ENCOUNTER: ICD-10-CM

## 2022-09-26 PROCEDURE — 87077 CULTURE AEROBIC IDENTIFY: CPT | Performed by: SURGERY

## 2022-09-26 PROCEDURE — 97607 NEG PRS WND THR NDME<=50SQCM: CPT

## 2022-09-26 PROCEDURE — 87070 CULTURE OTHR SPECIMN AEROBIC: CPT | Performed by: SURGERY

## 2022-09-26 PROCEDURE — 87205 SMEAR GRAM STAIN: CPT | Performed by: SURGERY

## 2022-09-26 PROCEDURE — 87186 SC STD MICRODIL/AGAR DIL: CPT | Performed by: SURGERY

## 2022-09-27 ENCOUNTER — HOSPITAL ENCOUNTER (OUTPATIENT)
Dept: PHYSICAL THERAPY | Facility: HOSPITAL | Age: 32
Setting detail: THERAPIES SERIES
Discharge: HOME | End: 2022-09-27

## 2022-09-27 DIAGNOSIS — M71.161 INFECTION OF RIGHT PREPATELLAR BURSA: Primary | ICD-10-CM

## 2022-09-27 PROCEDURE — 97110 THERAPEUTIC EXERCISES: CPT

## 2022-09-27 NOTE — THERAPY TREATMENT NOTE
Outpatient Physical Therapy Ortho Treatment Note   [place]     Patient Name: Jonny Arellano  : 1990  MRN: [id number]  Today's Date: 2022      Visit Date: 2022    Visit Dx:    ICD-10-CM ICD-9-CM   1. Infection of right prepatellar bursa  M71.161 726.65       Patient Active Problem List   Diagnosis   • Infection of right prepatellar bursa        Past Medical History:   Diagnosis Date   • Fracture, foot    • Migraines    • MRSA (methicillin resistant staph aureus) culture positive 2022    left knee        Past Surgical History:   Procedure Laterality Date   • INCISION AND DRAINAGE LEG Right 07/15/2022    Procedure: PATELLA BURSA EXCISION;  Surgeon: Daniel Chris MD;  Location: [facility];  Service: Orthopedics;  Laterality: Right;   • KNEE SURGERY  07/15/2022   • LACERATION REPAIR Right     index finger        PT Ortho     Row Name 22 1400       Subjective Comments    Subjective Comments pt states he has been having issues with his wound VAC and had to have the wound scraped last week - culture has not returned to show if there was infection; pt states he was not given any restrictions regarding ROM but is wanting to guard the dressing for at least another day and would like to leave the ace wrap on  -       Precautions and Contraindications    Precautions/Limitations other (see comments)  wound/wound vac right knee  -          User Key  (r) = Recorded By, (t) = Taken By, (c) = Cosigned By    Initials Name Provider Type     Travis Jade, ALAN Physical Therapist Assistant                             PT Assessment/Plan     Row Name 22 3642          PT Assessment    Assessment Comments Continues with significant quad weakness but improving; pt is experiencing decreased AROM today but likely from recent scraping of wound and having ace wrap in place; pt continues to need cues throughout ex's for proper technique and avoiding substitution  -            PT Plan     PT Plan Comments Cont per POC, progressing as pt tolerates  -           User Key  (r) = Recorded By, (t) = Taken By, (c) = Cosigned By    Initials Name Provider Type    Travis Stevenson, PTA Physical Therapist Assistant                   OP Exercises     Row Name 09/27/22 1552 09/27/22 1400          Subjective Comments    Subjective Comments -- pt states he has been having issues with his wound VAC and had to have the wound scraped last week - culture has not returned to show if there was infection; pt states he was not given any restrictions regarding ROM but is wanting to guard the dressing for at least another day and would like to leave the ace wrap on  -            Total Minutes    73196 - PT Therapeutic Exercise Minutes 40  -MH --            Exercise 1    Exercise Name 1 -- Heel slides  -     Cueing 1 -- Verbal;Tactile;Demo  [facility]     Time 1 -- 5 min  -            Exercise 2    Exercise Name 2 -- --  -MH     Cueing 2 -- --  -            Exercise 3    Exercise Name 3 -- Hamstring stretch  -     Cueing 3 -- Verbal;Tactile;Demo  -     Reps 3 -- 10  -     Time 3 -- 10 sec  -            Exercise 4    Exercise Name 4 -- Russian stim w/ QS  -     Cueing 4 -- --  -     Sets 4 -- --  -     Reps 4 -- Held today - unable to access area due to ace wrap/bandage  -     Time 4 -- --  -            Exercise 5    Exercise Name 5 -- SLR  -     Cueing 5 -- Verbal;Tactile;Demo  -     Reps 5 -- 17  -MH     Time 5 -- 1#  -            Exercise 6    Exercise Name 6 -- sidelying hip ABD  -MH     Cueing 6 -- Verbal;Tactile;Demo  -     Reps 6 -- 20  -     Time 6 -- 1#  -            Exercise 7    Exercise Name 7 -- sidelying hip ADD  -MH     Cueing 7 -- Verbal;Tactile;Demo  -     Reps 7 -- 20  -     Time 7 -- 1#  -            Exercise 8    Exercise Name 8 -- SAQ  -MH     Cueing 8 -- Verbal;Tactile;Demo  -     Reps 8 -- 25  -MH     Time 8 -- 5 sec  -     Additional Comments -- 1#  -   "          Exercise 9    Exercise Name 9 -- TKE vs theraband  -     Cueing 9 -- Verbal;Tactile;Demo  -     Reps 9 -- 50  -     Time 9 -- 5 sec  -     Additional Comments -- silver  -            Exercise 10    Exercise Name 10 -- Partial squat  -     Cueing 10 -- Verbal;Tactile;Demo  -     Reps 10 -- 30  -MH            Exercise 11    Exercise Name 11 -- 6\" forward step up  -     Cueing 11 -- Verbal;Demo  -     Reps 11 -- 25 each  -            Exercise 12    Exercise Name 12 -- LAQ  -     Cueing 12 -- Verbal  -     Reps 12 -- 25  -     Time 12 -- 5 sec  -     Additional Comments -- 1#  -            Exercise 13    Exercise Name 13 -- 2\" Lateral Dips  -     Cueing 13 -- Verbal  -     Reps 13 -- 15  -           User Key  (r) = Recorded By, (t) = Taken By, (c) = Cosigned By    Initials Name Provider Type     Travis Jade PTA Physical Therapist Assistant                                 Therapy Education  Given: HEP, Symptoms/condition management  Program: Reinforced  How Provided: Verbal, Demonstration  Provided to: Patient  Level of Understanding: Teach back education performed, Verbalized, Demonstrated              Time Calculation:   Start Time: 1413  Stop Time: 1500  Time Calculation (min): 47 min  Timed Charges  16030 - PT Therapeutic Exercise Minutes: 40  Total Minutes  Timed Charges Total Minutes: 40   Total Minutes: 40  Therapy Charges for Today     Code Description Service Date Service Provider Modifiers Qty    57166227469 HC PT THER PROC EA 15 MIN 9/27/2022 Travis Jade PTA GP 2                    Travis Jade PTA  9/27/2022     "

## 2022-09-29 ENCOUNTER — APPOINTMENT (OUTPATIENT)
Dept: WOUND CARE | Facility: HOSPITAL | Age: 32
End: 2022-09-29

## 2022-09-29 ENCOUNTER — HOSPITAL ENCOUNTER (OUTPATIENT)
Dept: PHYSICAL THERAPY | Facility: HOSPITAL | Age: 32
Setting detail: THERAPIES SERIES
Discharge: HOME | End: 2022-09-29

## 2022-09-29 DIAGNOSIS — M71.161 INFECTION OF RIGHT PREPATELLAR BURSA: Primary | ICD-10-CM

## 2022-09-29 LAB
BACTERIA SPEC AEROBE CULT: ABNORMAL
GRAM STN SPEC: ABNORMAL
GRAM STN SPEC: ABNORMAL

## 2022-09-29 PROCEDURE — 97110 THERAPEUTIC EXERCISES: CPT

## 2022-09-29 PROCEDURE — 97607 NEG PRS WND THR NDME<=50SQCM: CPT

## 2022-09-29 NOTE — THERAPY TREATMENT NOTE
Outpatient Physical Therapy Ortho Treatment Note   [facility]     Patient Name: Jonny Arellano  : 1990  MRN: [id number]  Today's Date: 2022      Visit Date: 2022    Visit Dx:    ICD-10-CM ICD-9-CM   1. Infection of right prepatellar bursa  M71.161 726.65       Patient Active Problem List   Diagnosis   • Infection of right prepatellar bursa        Past Medical History:   Diagnosis Date   • Fracture, foot    • Migraines    • MRSA (methicillin resistant staph aureus) culture positive 2022    left knee        Past Surgical History:   Procedure Laterality Date   • INCISION AND DRAINAGE LEG Right 07/15/2022    Procedure: PATELLA BURSA EXCISION;  Surgeon: Daniel Chris MD;  Location: [facility];  Service: Orthopedics;  Laterality: Right;   • KNEE SURGERY  07/15/2022   • LACERATION REPAIR Right     index finger        PT Ortho     Row Name 22 0800       Subjective Comments    Subjective Comments pt reporting decreased ankle edema; no further complaints in regards to his knee; pt does report back pain this morning - likely due to return to work activities per pt  -       Precautions and Contraindications    Precautions/Limitations other (see comments)  wound/wound vac right knee  -          User Key  (r) = Recorded By, (t) = Taken By, (c) = Cosigned By    Initials Name Provider Type    Travis Stevenson PTA Physical Therapist Assistant                             PT Assessment/Plan     Row Name 22 0994          PT Assessment    Assessment Comments Minor modifications to HEP due to pt's report of back pain otherwise tolerated treatment well; continues to need cues to avoid compenstation with lifting (R)LE - this is only habit at this point  -            PT Plan    PT Plan Comments Cont per POC, progressing as tolerated  -           User Key  (r) = Recorded By, (t) = Taken By, (c) = Cosigned By    Initials Name Provider Type    Travis Stevenson PTA Physical  "Therapist Assistant                   OP Exercises     Row Name 09/29/22 0946 09/29/22 0800          Subjective Comments    Subjective Comments -- pt reporting decreased ankle edema; no further complaints  -            Total Minutes    57787 - PT Therapeutic Exercise Minutes 40  -MH --            Exercise 1    Exercise Name 1 -- Heel slides  -     Cueing 1 -- Verbal;Tactile;Demo  -     Time 1 -- 5 min  -            Exercise 3    Exercise Name 3 -- Hamstring stretch  -     Cueing 3 -- Verbal;Tactile;Demo  -     Reps 3 -- 10  -     Time 3 -- 10 sec  -            Exercise 4    Exercise Name 4 -- Russian stim  -     Cueing 4 -- Verbal  -     Sets 4 -- 10/10  -     Reps 4 -- QS  -     Time 4 -- 10 min  -            Exercise 5    Exercise Name 5 -- SLR  -     Cueing 5 -- Verbal;Tactile;Demo  -     Reps 5 -- 20  -     Time 5 -- 1#  -            Exercise 6    Exercise Name 6 -- sidelying hip ABD  -     Cueing 6 -- Verbal;Tactile;Demo  -     Reps 6 -- 20  -     Time 6 -- 1#  -            Exercise 7    Exercise Name 7 -- sidelying hip ADD  -     Cueing 7 -- Verbal;Tactile;Demo  -     Reps 7 -- 8  -     Time 7 -- 1#  -     Additional Comments -- Modified  -            Exercise 8    Exercise Name 8 -- SAQ  -     Cueing 8 -- Verbal;Tactile;Demo  -     Reps 8 -- 25  -     Time 8 -- 5 sec  -     Additional Comments -- 1#  -            Exercise 9    Exercise Name 9 -- TKE vs theraband  -     Cueing 9 -- Verbal;Tactile;Demo  -     Reps 9 -- 50  -     Time 9 -- 5 sec  -     Additional Comments -- gold  -            Exercise 10    Exercise Name 10 -- Partial squat  -     Cueing 10 -- --  -     Reps 10 -- Held per pt - back pain  -            Exercise 11    Exercise Name 11 -- 6\" forward step up  -     Cueing 11 -- Verbal  -     Reps 11 -- 25 each  -            Exercise 12    Exercise Name 12 -- LAQ  -     Cueing 12 -- Verbal  -     Reps 12 -- 25  " "-     Time 12 -- 5 sec  -     Additional Comments -- 1#  -            Exercise 13    Exercise Name 13 -- 2\" Lateral Dips  -     Cueing 13 -- Verbal  -     Reps 13 -- 15  -           User Key  (r) = Recorded By, (t) = Taken By, (c) = Cosigned By    Initials Name Provider Type     Travis Jade PTA Physical Therapist Assistant                                 Therapy Education  Given: HEP  Program: Reinforced, Modified  How Provided: Verbal, Demonstration  Provided to: Patient  Level of Understanding: Teach back education performed, Verbalized, Demonstrated              Time Calculation:   Start Time: 0828  Stop Time: 0920  Time Calculation (min): 52 min  Timed Charges  61562 - PT Therapeutic Exercise Minutes: 40  Total Minutes  Timed Charges Total Minutes: 40   Total Minutes: 40  Therapy Charges for Today     Code Description Service Date Service Provider Modifiers Qty    26881681440 HC PT THER PROC EA 15 MIN 9/29/2022 Travis Jade, ALAN GP 3                    Travis Jade PTA  9/29/2022     "

## 2022-10-03 ENCOUNTER — APPOINTMENT (OUTPATIENT)
Dept: WOUND CARE | Facility: HOSPITAL | Age: 32
End: 2022-10-03

## 2022-10-04 ENCOUNTER — HOSPITAL ENCOUNTER (OUTPATIENT)
Dept: PHYSICAL THERAPY | Facility: HOSPITAL | Age: 32
Setting detail: THERAPIES SERIES
Discharge: HOME | End: 2022-10-04

## 2022-10-04 DIAGNOSIS — M71.161 INFECTION OF RIGHT PREPATELLAR BURSA: Primary | ICD-10-CM

## 2022-10-04 PROCEDURE — 97110 THERAPEUTIC EXERCISES: CPT

## 2022-10-04 NOTE — THERAPY RE-EVALUATION
"    Outpatient Physical Therapy Ortho Re-Evaluation   Bridgett Simmons     Patient Name: Jonny Arellano  : 1990  MRN: [id number]  Today's Date: 10/4/2022      Visit Date: 10/04/2022    Patient Active Problem List   Diagnosis   • Infection of right prepatellar bursa        Past Medical History:   Diagnosis Date   • Fracture, foot    • Migraines    • MRSA (methicillin resistant staph aureus) culture positive 2022    left knee        Past Surgical History:   Procedure Laterality Date   • INCISION AND DRAINAGE LEG Right 07/15/2022    Procedure: PATELLA BURSA EXCISION;  Surgeon: Daniel Chris MD;  Location: [facility];  Service: Orthopedics;  Laterality: Right;   • KNEE SURGERY  07/15/2022   • LACERATION REPAIR Right     index finger       Visit Dx:     ICD-10-CM ICD-9-CM   1. Infection of right prepatellar bursa  M71.161 726.65              PT Ortho     Row Name 10/04/22 1500       Subjective Comments    Subjective Comments Pt RTW last week, working every other day. 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 yes  -LN    Pre-Treatment Pain Level 5  \"5/10 max\"  -LN    Subjective Pain Comment He reports increased pain when he has the nerve pain and that is \"annoying\"  and at times, he has trouble going to sleep due to the nerve pain- he is on Lyrica for the nerve pain.  -LN       Posture/Observations    Observations Edema;Muscle atrophy  -LN    Posture/Observations Comments Wound vac still intact right knee wound; bandage and ace wrap intact.  -LN       Right Lower Ext    Rt " Knee Extension/Flexion AROM 141 degrees flexion; 0 degrees knee extension.  -LN    Rt Knee Extension/Flexion PROM supine AA with sheet  -LN       MMT Right Lower Ext    Rt Hip Flexion MMT, Gross Movement (5/5) normal  -LN    Rt Hip Extension MMT, Gross Movement (5/5) normal  -LN    Rt Hip ABduction MMT, Gross Movement (4+/5) good plus  -LN    Rt Hip ADduction MMT, Gross Movement (4/5) good  -LN    Rt Knee Extension MMT, Gross Movement (3+/5) fair plus  VMO 3/5; fair QS noted.  -LN    Rt Knee Flexion MMT, Gross Movement (4/5) good  -LN    Rt Ankle Plantarflexion MMT, Gross Movement (5/5) normal  -LN    Rt Ankle Dorsiflexion MMT, Gross Movement (5/5) normal  -LN       Lower Extremity Flexibility    Hamstrings Right:;Mildly limited  -LN    ITB Right:;Mildly limited  -LN    Gastrocnemius Right:;Mildly limited  -LN       Transfers    Sit-Stand [place] (Transfers) independent  -LN    Stand-Sit [place] (Transfers) independent  -LN    Transfers, Sit-Stand-Sit, Assist Device other (see comments)  none  -LN       Gait/Stairs (Locomotion)    [place] Level (Gait) independent  -LN    Assistive Device (Gait) other (see comments)  none  -LN    Deviations/Abnormal Patterns (Gait) right sided deviations  -LN    Right Sided Gait Deviations heel strike decreased  -LN    Comment, (Gait/Stairs) Pt ambulates with minimal to nominal antalgic gait with improved knee flexion and improved heel to toe gait noted.  -LN          User Key  (r) = Recorded By, (t) = Taken By, (c) = Cosigned By    Initials Name Provider Type    Liz Pickens, PT Physical Therapist                            Therapy Education  Given: HEP, Symptoms/condition management  Program: Reinforced, Progressed  How Provided: Verbal, Demonstration  Provided to: Patient  Level of Understanding: Teach back education performed, Verbalized, Demonstrated      PT OP Goals     Row Name 10/04/22 1500          PT Short Term Goals    STG Date to Achieve --   all STG met  -LN     STG 1 Patient to verbally report decreased right knee pain to <6/10 with everyday activities, including exercises.  -LN     STG 1 Progress Met  -LN     STG 1 Progress Comments He now reports right knee pain is a 5/10 max.  -LN     STG 2 Pt independent with initial HEP issued by therapist.  -LN     STG 2 Progress Met  -LN     STG 3 Right knee AROM improved to 2-110 degrees to allow him to get in and out of car easier.  -LN     STG 3 Progress Met  -LN     STG 3 Progress Comments 0-141 degrees today  -LN     STG 4 Right hip and knee strength improved by 1/2-1 muscle grade.  -LN     STG 4 Progress Met  -LN     STG 5 Patient able to perform 10 reps right SLR without any assist.  -LN     STG 5 Progress Met  -LN     STG 6 Patient to report decreased shooting pains down R leg into foot by 25%-50%.  -LN     STG 6 Progress Met  -LN     STG 6 Progress Comments He reports shooting pain is 50% less but still very uncomfortable when it occurs and seems to be worse at night.  -LN            Long Term Goals    LTG Date to Achieve 11/01/22  -LN     LTG 1 Patient to verbally report decreased right knee pain to <4/10 with everyday activities, including exercises.  -LN     LTG 1 Progress Ongoing;Progressing  -LN     LTG 1 Progress Comments He now reports right knee pain is a 5/10 max.  -LN     LTG 2 Pt independent with more advanced HEP issued by therapist.  -LN     LTG 2 Progress Ongoing;Progressing  -LN     LTG 3 Right knee AROM improved to 0-125 degrees.  -LN     LTG 3 Progress Met  -LN     LTG 4 Right knee strength improved to 4-4+/5.  -LN     LTG 4 Progress Ongoing;Progressing;Partially Met  -LN     LTG 4 Progress Comments met for knee flexion; knee extension improved to 3+/5; VMO to 3/5.  -LN     LTG 5 Patient able to ambulate with only nominal limp and good heel to toe gait noted.  -LN     LTG 5 Progress Ongoing;Progressing  -LN     LTG 5 Progress Comments Pt now ambulates with minimal to nominal limp and  improved heel to gait. Improved knee flexion also noted with ambulation.  -LN     LTG 6 Patient to have improved LEFS score by 25-30 points.  -LN     LTG 6 Progress Ongoing  -LN     LTG 6 Progress Comments has not been reassessed at this time yet.  -LN     LTG 7 Patient to report decreased shooting pains down R leg into foot by 50%-75%.  -LN     LTG 7 Progress Partially Met;Ongoing;Progressing  -LN     LTG 7 Progress Comments He reports shooting pain is 50% less but still very uncomfortable when it occurs and seems to be worse at night.  -LN     LTG 8 Pt able to perform all job duties without any c/o increased R knee pain.  -LN     LTG 8 Progress New  -LN           User Key  (r) = Recorded By, (t) = Taken By, (c) = Cosigned By    Initials Name Provider Type    Liz Pickens, PT Physical Therapist                 PT Assessment/Plan     Row Name 10/04/22 1500          PT Assessment    Assessment Comments Pt with overall decreased c/o right knee pain but still with occasional nerve pain shahbaz at night, but reports that nerve pain is 50% less than it was. Pt with normal right knee ROM now and improved hip and knee strength but still with decreased right Quads and VMO strength and muscle atrophy persists. He can now do SLR without assist but he still tends to occasionally compensate when lifting leg shahbaz onto and off treatment table (feel this is now a habit). Did have to decrease # of exercises done today due to pt had to get to wound center to get wound vac checked. Limp decreased to minimal to nominal with improved heel to toe gait and improved knee flexion noted. Pt still with wound vac intact right knee but he reports wound is healing well. Pt has RTW and seems to be tolerating it well; only doing what he can tolerate per pt. He has met all STG and has met 1 LTG and partially met 2 LTG. He is making good progress towards all remaining LTG. He will benefit from continued PT to progress with strengthening,  "shahbaz R Quads and VMO, to increase muscle tone and strength to allow him to return to all prior activities without difficulty.  -LN            PT Plan    PT Frequency 2x/week  -LN     Predicted Duration of Therapy Intervention (PT) 4 weeks  -LN     PT Plan Comments Continue per POC. Progress with exercises/Quad and VMO strrengthening as tolerated. Russian stim; consider doing Russian stim with SAQ.  Continue towards remaining goals; 1 new LTG set. -LN           User Key  (r) = Recorded By, (t) = Taken By, (c) = Cosigned By    Initials Name Provider Type    LN Liz Thornton, PT Physical Therapist                   OP Exercises     Row Name 10/04/22 1500             Subjective Comments    Subjective Comments Pt RTW last week, working every other day. 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 yes  -LN      Pre-Treatment Pain Level 5  \"5/10 max\"  -LN      Subjective Pain Comment He reports increased pain when he has the nerve pain and that is \"annoying\"  and at times, he has trouble going to sleep due to the nerve pain- he is on Lyrica for the nerve pain.  -LN              Total Minutes    12317 - PT Therapeutic Exercise Minutes 30  -LN              Exercise 1    Exercise Name 1 Heel slides  -LN      Cueing 1 Verbal;Tactile;Demo  -LN      Time 1 5 min  -LN              Exercise 3    Exercise Name 3 Hamstring stretch  -LN      Cueing 3 Verbal;Tactile;Demo  -LN      Reps 3 10  -LN      Time 3 10 sec  -LN              Exercise 4    Exercise Name 4 Russian stim  -LN      Cueing 4 Verbal  -LN      Sets 4 10/10  -LN      Reps 4 QS  -LN      Time 4 10 min  -LN              Exercise 5    Exercise Name 5 " "SLR  -LN      Cueing 5 Verbal;Tactile;Demo  -LN      Reps 5 25  -LN      Time 5 1#  -LN              Exercise 6    Exercise Name 6 sidelying hip ABD  -LN      Cueing 6 Verbal;Tactile;Demo  -LN      Reps 6 25  -LN      Time 6 1#  -LN              Exercise 7    Exercise Name 7 sidelying hip ADD  -LN      Cueing 7 Verbal;Tactile;Demo  -LN      Reps 7 25  -LN      Time 7 1#  -LN              Exercise 8    Exercise Name 8 SAQ  -LN      Cueing 8 Verbal;Tactile;Demo  -LN      Reps 8 25  -LN      Time 8 5 sec  -LN      Additional Comments 1#  -LN              Exercise 9    Exercise Name 9 TKE vs theraband  -LN      Cueing 9 Verbal;Tactile;Demo  -LN      Reps 9 50  -LN      Time 9 5 sec  -LN      Additional Comments gold- HEP today  -LN              Exercise 10    Exercise Name 10 Partial squat  -LN      Reps 10 held today  -LN              Exercise 11    Exercise Name 11 6\" forward step up  -LN      Cueing 11 Verbal  -LN      Reps 11 25 each  -LN      Additional Comments HEP- today  -LN              Exercise 12    Exercise Name 12 LAQ  -LN      Cueing 12 Verbal  -LN      Reps 12 25  -LN      Time 12 5 sec  -LN      Additional Comments 1#  -LN              Exercise 13    Exercise Name 13 2\" Lateral Dips  -LN      Cueing 13 Verbal  -LN      Reps 13 15  -LN      Additional Comments HEP-today  -LN            User Key  (r) = Recorded By, (t) = Taken By, (c) = Cosigned By    Initials Name Provider Type    LN Liz Thornton, PT Physical Therapist                                        Time Calculation:     Start Time: 1515  Stop Time: 1550  Time Calculation (min): 35 min  Timed Charges  35140 - PT Therapeutic Exercise Minutes: 30  Total Minutes  Timed Charges Total Minutes: 30   Total Minutes: 30     Therapy Charges for Today     Code Description Service Date Service Provider Modifiers Qty    04002481860 HC PT THER PROC EA 15 MIN 10/4/2022 Liz Thornton, PT GP 2                    Liz Thornton, " PT  10/4/2022

## 2022-10-06 ENCOUNTER — APPOINTMENT (OUTPATIENT)
Dept: WOUND CARE | Facility: HOSPITAL | Age: 32
End: 2022-10-06

## 2022-10-06 PROCEDURE — 97607 NEG PRS WND THR NDME<=50SQCM: CPT

## 2022-10-07 ENCOUNTER — HOSPITAL ENCOUNTER (OUTPATIENT)
Dept: PHYSICAL THERAPY | Facility: HOSPITAL | Age: 32
Setting detail: THERAPIES SERIES
Discharge: HOME | End: 2022-10-07

## 2022-10-07 DIAGNOSIS — M71.161 INFECTION OF RIGHT PREPATELLAR BURSA: Primary | ICD-10-CM

## 2022-10-07 PROCEDURE — 97110 THERAPEUTIC EXERCISES: CPT

## 2022-10-07 NOTE — THERAPY TREATMENT NOTE
Outpatient Physical Therapy Ortho Treatment Note   Bridgett Simmons     Patient Name: Jonny Arellano  : 1990  MRN: [id number]  Today's Date: 10/7/2022      Visit Date: 10/07/2022    Visit Dx:    ICD-10-CM ICD-9-CM   1. Infection of right prepatellar bursa  M71.161 726.65       Patient Active Problem List   Diagnosis   • Infection of right prepatellar bursa        Past Medical History:   Diagnosis Date   • Fracture, foot    • Migraines    • MRSA (methicillin resistant staph aureus) culture positive 2022    left knee        Past Surgical History:   Procedure Laterality Date   • INCISION AND DRAINAGE LEG Right 07/15/2022    Procedure: PATELLA BURSA EXCISION;  Surgeon: Daniel Chris MD;  Location: [facility];  Service: Orthopedics;  Laterality: Right;   • KNEE SURGERY  07/15/2022   • LACERATION REPAIR Right     index finger                        PT Assessment/Plan     Row Name 10/07/22 0810          PT Assessment    Assessment Comments Pt tolerated progression of ther ex well. Improved quad control noted with lateral dips  -KM            PT Plan    PT Plan Comments Continue per POC  -KM           User Key  (r) = Recorded By, (t) = Taken By, (c) = Cosigned By    Initials Name Provider Type    Yadi Kiran, ALAN Physical Therapist Assistant                   OP Exercises     Row Name 10/07/22 0810             Subjective Comments    Subjective Comments Pt states he is sore after working everyday.  -KM              Exercise 1    Exercise Name 1 Heel slides  -KM      Cueing 1 Verbal;Tactile;Demo  -KM      Time 1 5 min  -KM              Exercise 3    Exercise Name 3 Hamstring stretch  -KM      Cueing 3 Verbal;Tactile;Demo  -KM      Reps 3 10  -KM      Time 3 10 sec  -KM              Exercise 4    Exercise Name 4 Russian stim  -KM      Cueing 4 Verbal  -KM      Sets 4 10/10  -KM      Reps 4 QS  -KM      Time 4 10 min  -KM              Exercise 5    Exercise Name 5 SLR  -KM      Cueing 5  "Verbal;Tactile;Demo  -KM      Reps 5 25  -KM      Time 5 2#  -KM              Exercise 6    Exercise Name 6 sidelying hip ABD  -KM      Cueing 6 Verbal;Tactile;Demo  -KM      Reps 6 25  -KM      Time 6 2#  -KM              Exercise 7    Exercise Name 7 sidelying hip ADD  -KM      Cueing 7 Verbal;Tactile;Demo  -KM      Reps 7 25  -KM      Time 7 2#  -KM              Exercise 8    Exercise Name 8 SAQ  -KM      Cueing 8 Verbal;Tactile;Demo  -KM      Reps 8 25  -KM      Time 8 5 sec  -KM      Additional Comments 2#  -KM              Exercise 9    Exercise Name 9 TKE vs theraband  -KM      Cueing 9 Verbal;Tactile;Demo  -KM      Reps 9 50  -KM      Time 9 5 sec  -KM      Additional Comments Gold  -KM              Exercise 10    Exercise Name 10 Partial squat  -KM      Reps 10 25  -KM              Exercise 11    Exercise Name 11 6\" forward step up  -KM      Cueing 11 Verbal  -KM      Reps 11 25 each  -KM              Exercise 12    Exercise Name 12 LAQ  -KM      Cueing 12 Verbal  -KM      Reps 12 25  -KM      Time 12 5 sec  -KM      Additional Comments 2#  -KM              Exercise 13    Exercise Name 13 2\" Lateral Dips  -KM      Cueing 13 Verbal  -KM      Reps 13 15  -KM            User Key  (r) = Recorded By, (t) = Taken By, (c) = Cosigned By    Initials Name Provider Type    Yadi Kiran PTA Physical Therapist Assistant                                                Time Calculation:   Start Time: 0810  Stop Time: 0854  Time Calculation (min): 44 min  Therapy Charges for Today     Code Description Service Date Service Provider Modifiers Qty    14511540226 HC PT THER PROC EA 15 MIN 10/7/2022 Yadi Mcarthur PTA GP 2                    Yadi Mcarthur PTA  10/7/2022     "

## 2022-10-10 ENCOUNTER — APPOINTMENT (OUTPATIENT)
Dept: WOUND CARE | Facility: HOSPITAL | Age: 32
End: 2022-10-10

## 2022-10-10 PROCEDURE — 97607 NEG PRS WND THR NDME<=50SQCM: CPT

## 2022-10-11 ENCOUNTER — HOSPITAL ENCOUNTER (OUTPATIENT)
Dept: PHYSICAL THERAPY | Facility: HOSPITAL | Age: 32
Setting detail: THERAPIES SERIES
Discharge: HOME | End: 2022-10-11

## 2022-10-11 DIAGNOSIS — M71.161 INFECTION OF RIGHT PREPATELLAR BURSA: Primary | ICD-10-CM

## 2022-10-11 PROCEDURE — 97110 THERAPEUTIC EXERCISES: CPT

## 2022-10-11 NOTE — THERAPY TREATMENT NOTE
Outpatient Physical Therapy Ortho Treatment Note   [facility]     Patient Name: Jonny Arellano  : 1990  MRN: [id number]  Today's Date: 10/11/2022      Visit Date: 10/11/2022    Visit Dx:    ICD-10-CM ICD-9-CM   1. Infection of right prepatellar bursa  M71.161 726.65       Patient Active Problem List   Diagnosis   • Infection of right prepatellar bursa        Past Medical History:   Diagnosis Date   • Fracture, foot    • Migraines    • MRSA (methicillin resistant staph aureus) culture positive 2022    left knee        Past Surgical History:   Procedure Laterality Date   • INCISION AND DRAINAGE LEG Right 07/15/2022    Procedure: PATELLA BURSA EXCISION;  Surgeon: Daniel Chris MD;  Location: [facility];  Service: Orthopedics;  Laterality: Right;   • KNEE SURGERY  07/15/2022   • LACERATION REPAIR Right     index finger                        PT Assessment/Plan     Row Name 10/11/22 0810          PT Assessment    Assessment Comments Pt has made good progress with overall improved ROM and strength. Pt with improved quad control with lateral dips.  -KM        PT Plan    PT Plan Comments Continue POC per MD  -KM           User Key  (r) = Recorded By, (t) = Taken By, (c) = Cosigned By    Initials Name Provider Type    Yadi Kiran, PTA Physical Therapist Assistant                   OP Exercises     Row Name 10/11/22 0810             Subjective Comments    Subjective Comments Pt states his knee and wound is healing well.  -KM         Exercise 1    Exercise Name 1 Heel slides  -KM      Cueing 1 Verbal;Tactile;Demo  -KM      Time 1 5 min  -KM         Exercise 3    Exercise Name 3 Hamstring stretch  -KM      Cueing 3 Verbal;Tactile;Demo  -KM      Reps 3 10  -KM      Time 3 10 sec  -KM         Exercise 4    Exercise Name 4 Russian stim  -KM      Cueing 4 Verbal  -KM      Sets 4 10/10  -KM      Reps 4 QS  -KM      Time 4 10 min  -KM         Exercise 5    Exercise Name 5 SLR  -KM      Cueing 5  "Verbal;Tactile;Demo  -KM      Reps 5 30  -KM      Time 5 2#  -KM         Exercise 6    Exercise Name 6 sidelying hip ABD  -KM      Cueing 6 Verbal;Tactile;Demo  -KM      Reps 6 30  -KM      Time 6 2#  -KM         Exercise 7    Exercise Name 7 sidelying hip ADD  -KM      Cueing 7 Verbal;Tactile;Demo  -KM      Reps 7 30  -KM      Time 7 2#  -KM         Exercise 8    Exercise Name 8 SAQ  -KM      Cueing 8 Verbal;Tactile;Demo  -KM      Reps 8 40  -KM      Time 8 5 sec  -KM      Additional Comments 2#  -KM         Exercise 9    Exercise Name 9 TKE vs theraband  -KM      Cueing 9 Verbal;Tactile;Demo  -KM      Reps 9 50  -KM      Time 9 5 sec  -KM      Additional Comments Gold  -KM         Exercise 10    Exercise Name 10 Partial squat  -KM      Reps 10 25  -KM         Exercise 11    Exercise Name 11 6\" forward step up  -KM      Cueing 11 Verbal  -KM      Reps 11 25 each  -KM         Exercise 12    Exercise Name 12 LAQ  -KM      Cueing 12 Verbal  -KM      Reps 12 40  -KM      Time 12 5 sec  -KM      Additional Comments 2#  -KM         Exercise 13    Exercise Name 13 4\" Lateral Dips  -KM      Cueing 13 Verbal  -KM      Reps 13 25  -KM         Exercise 14    Exercise Name 14 6\" Lateral Step Overs  -KM      Reps 14 25  -KM            User Key  (r) = Recorded By, (t) = Taken By, (c) = Cosigned By    Initials Name Provider Type    Yadi Kiran PTA Physical Therapist Assistant                                                Time Calculation:   Start Time: 0810  Stop Time: 0855  Time Calculation (min): 45 min  Therapy Charges for Today     Code Description Service Date Service Provider Modifiers Qty    22046729600 HC PT THER PROC EA 15 MIN 10/11/2022 Yadi Mcarthur PTA GP 2                    Yadi Mcarthur PTA  10/11/2022     "

## 2022-10-14 ENCOUNTER — APPOINTMENT (OUTPATIENT)
Dept: PHYSICAL THERAPY | Facility: HOSPITAL | Age: 32
End: 2022-10-14

## 2022-10-17 ENCOUNTER — APPOINTMENT (OUTPATIENT)
Dept: WOUND CARE | Facility: HOSPITAL | Age: 32
End: 2022-10-17

## 2022-10-17 PROCEDURE — 97607 NEG PRS WND THR NDME<=50SQCM: CPT

## 2022-10-18 ENCOUNTER — HOSPITAL ENCOUNTER (OUTPATIENT)
Dept: PHYSICAL THERAPY | Facility: HOSPITAL | Age: 32
Setting detail: THERAPIES SERIES
Discharge: HOME | End: 2022-10-18

## 2022-10-18 DIAGNOSIS — M71.161 INFECTION OF RIGHT PREPATELLAR BURSA: Primary | ICD-10-CM

## 2022-10-18 PROCEDURE — 97110 THERAPEUTIC EXERCISES: CPT

## 2022-10-18 NOTE — THERAPY TREATMENT NOTE
Outpatient Physical Therapy Ortho Treatment Note   Bridgett Simmons     Patient Name: Jonny Arellano  : 1990  MRN: [id number]  Today's Date: 10/18/2022      Visit Date: 10/18/2022    Visit Dx:    ICD-10-CM ICD-9-CM   1. Infection of right prepatellar bursa  M71.161 726.65       Patient Active Problem List   Diagnosis   • Infection of right prepatellar bursa        Past Medical History:   Diagnosis Date   • Fracture, foot    • Migraines    • MRSA (methicillin resistant staph aureus) culture positive 2022    left knee        Past Surgical History:   Procedure Laterality Date   • INCISION AND DRAINAGE LEG Right 07/15/2022    Procedure: PATELLA BURSA EXCISION;  Surgeon: Daniel Chris MD;  Location: [facility];  Service: Orthopedics;  Laterality: Right;   • KNEE SURGERY  07/15/2022   • LACERATION REPAIR Right     index finger                        PT Assessment/Plan     Row Name 10/18/22 0810          PT Assessment    Assessment Comments Pt with improved tolerance and strenght with prescribed exercises  -KM        PT Plan    PT Plan Comments With pts improved status, plan to see pt 1x/week  -KM           User Key  (r) = Recorded By, (t) = Taken By, (c) = Cosigned By    Initials Name Provider Type    Yadi Kiran, PTA Physical Therapist Assistant                   OP Exercises     Row Name 10/18/22 0810             Subjective Comments    Subjective Comments Pt states he notices much improvement with strength and function at work  -KM         Exercise 1    Exercise Name 1 Bike (seat 1)  -KM      Cueing 1 Verbal;Tactile;Demo  -KM      Time 1 5 min  -KM         Exercise 3    Exercise Name 3 Hamstring stretch  -KM      Cueing 3 Verbal;Tactile;Demo  -KM      Reps 3 10  -KM      Time 3 10 sec  -KM         Exercise 4    Exercise Name 4 Russian stim  -KM      Cueing 4 Verbal  -KM      Sets 4 10/10  -KM      Reps 4 SAQ  -KM      Time 4 10 min  -KM         Exercise 5    Exercise Name 5 SLR  -KM       "Cueing 5 Verbal;Tactile;Demo  -KM      Reps 5 25  -KM      Time 5 3#  -KM         Exercise 6    Exercise Name 6 sidelying hip ABD  -KM      Cueing 6 Verbal;Tactile;Demo  -KM      Reps 6 25  -KM      Time 6 3#  -KM         Exercise 7    Exercise Name 7 sidelying hip ADD  -KM      Cueing 7 Verbal;Tactile;Demo  -KM      Reps 7 25  -KM      Time 7 3#  -KM         Exercise 8    Exercise Name 8 SAQ  -KM      Cueing 8 Verbal;Tactile;Demo  -KM      Reps 8 --  -KM      Time 8 --  -KM         Exercise 9    Exercise Name 9 TKE vs theraband  -KM      Cueing 9 Verbal;Tactile;Demo  -KM      Reps 9 50  -KM      Time 9 5 sec  -KM      Additional Comments Gold  -KM         Exercise 10    Exercise Name 10 Partial squat  -KM      Reps 10 25  -KM         Exercise 11    Exercise Name 11 6\" forward step up  -KM      Cueing 11 Verbal  -KM      Reps 11 25 each  -KM         Exercise 12    Exercise Name 12 LAQ  -KM      Cueing 12 Verbal  -KM      Reps 12 40  -KM      Time 12 5 sec  -KM      Additional Comments 3#  -KM         Exercise 13    Exercise Name 13 6\" Lateral Dips  -KM      Cueing 13 Verbal  -KM      Reps 13 25  -KM         Exercise 14    Exercise Name 14 6\" Lateral Step Overs  -KM      Reps 14 25  -KM         Exercise 15    Exercise Name 15 CC: Retro & Lat  -KM            User Key  (r) = Recorded By, (t) = Taken By, (c) = Cosigned By    Initials Name Provider Type     Yadi Mcarthur PTA Physical Therapist Assistant                                                Time Calculation:   Start Time: 0810  Stop Time: 0850  Time Calculation (min): 40 min  Therapy Charges for Today     Code Description Service Date Service Provider Modifiers Qty    96917909597  PT THER PROC EA 15 MIN 10/18/2022 Yadi Mcarthur PTA GP 2                    Yadi Mcarthur PTA  10/18/2022     "

## 2022-10-24 ENCOUNTER — APPOINTMENT (OUTPATIENT)
Dept: WOUND CARE | Facility: HOSPITAL | Age: 32
End: 2022-10-24

## 2022-10-24 PROCEDURE — 97607 NEG PRS WND THR NDME<=50SQCM: CPT

## 2022-10-25 ENCOUNTER — HOSPITAL ENCOUNTER (OUTPATIENT)
Dept: PHYSICAL THERAPY | Facility: HOSPITAL | Age: 32
Setting detail: THERAPIES SERIES
Discharge: HOME | End: 2022-10-25

## 2022-10-25 DIAGNOSIS — M71.161 INFECTION OF RIGHT PREPATELLAR BURSA: Primary | ICD-10-CM

## 2022-10-25 PROCEDURE — 97110 THERAPEUTIC EXERCISES: CPT

## 2022-10-25 NOTE — THERAPY TREATMENT NOTE
Outpatient Physical Therapy Ortho Treatment Note   Bridgett Simmons     Patient Name: Jonny Arellano  : 1990  MRN: [id number]  Today's Date: 10/25/2022      Visit Date: 10/25/2022    Visit Dx:    ICD-10-CM ICD-9-CM   1. Infection of right prepatellar bursa  M71.161 726.65       Patient Active Problem List   Diagnosis   • Infection of right prepatellar bursa        Past Medical History:   Diagnosis Date   • Fracture, foot    • Migraines    • MRSA (methicillin resistant staph aureus) culture positive 2022    left knee        Past Surgical History:   Procedure Laterality Date   • INCISION AND DRAINAGE LEG Right 07/15/2022    Procedure: PATELLA BURSA EXCISION;  Surgeon: Daniel Chris MD;  Location: [facility];  Service: Orthopedics;  Laterality: Right;   • KNEE SURGERY  07/15/2022   • LACERATION REPAIR Right     index finger        PT Ortho     Row Name 10/25/22 08       Right Lower Ext    Rt Knee Extension/Flexion AROM 0-141 degrees  -KM          User Key  (r) = Recorded By, (t) = Taken By, (c) = Cosigned By    Initials Name Provider Type    Yadi Kiran PTA Physical Therapist Assistant                             PT Assessment/Plan     Row Name 10/25/22 08          PT Assessment    Assessment Comments Pt has made good progress toward goals with improved ROM and functional mobillty  -KM        PT Plan    PT Plan Comments Continue POC per MD  -KM           User Key  (r) = Recorded By, (t) = Taken By, (c) = Cosigned By    Initials Name Provider Type    Yadi Kiran PTA Physical Therapist Assistant                   OP Exercises     Row Name 10/25/22 08             Subjective Comments    Subjective Comments Pt states his knee is doing well. States he tolerates daily activities well but does notice weakness in (R) quad. Pt to see MD tomorrow.  -KM         Exercise 1    Exercise Name 1 Bike (seat 1)  -KM      Cueing 1 Verbal;Tactile;Demo  -KM      Time 1 5 min  -KM         Exercise 3  "   Exercise Name 3 Hamstring stretch  -KM      Cueing 3 Verbal;Tactile;Demo  -KM      Reps 3 10  -KM      Time 3 10 sec  -KM         Exercise 4    Exercise Name 4 Russian stim  -KM      Cueing 4 Verbal  -KM      Sets 4 10/10  -KM      Reps 4 SAQ  -KM      Time 4 10 min  -KM         Exercise 5    Exercise Name 5 SLR  -KM      Cueing 5 Verbal;Tactile;Demo  -KM      Reps 5 30  -KM      Time 5 3#  -KM         Exercise 6    Exercise Name 6 sidelying hip ABD  -KM      Cueing 6 Verbal;Tactile;Demo  -KM      Reps 6 30  -KM      Time 6 3#  -KM         Exercise 7    Exercise Name 7 sidelying hip ADD  -KM      Cueing 7 Verbal;Tactile;Demo  -KM      Reps 7 25  -KM      Time 7 3#  -KM         Exercise 8    Exercise Name 8 SAQ  -KM      Cueing 8 Verbal;Tactile;Demo  -KM         Exercise 9    Exercise Name 9 TKE vs theraband  -KM      Cueing 9 Verbal;Tactile;Demo  -KM      Reps 9 50  -KM      Time 9 5 sec  -KM      Additional Comments Gold  -KM         Exercise 10    Exercise Name 10 Partial squat  -KM      Reps 10 25  -KM         Exercise 11    Exercise Name 11 6\" forward step up  -KM      Cueing 11 Verbal  -KM      Reps 11 25 each  -KM         Exercise 12    Exercise Name 12 LAQ  -KM      Cueing 12 Verbal  -KM      Reps 12 40  -KM      Time 12 5 sec  -KM      Additional Comments 3#  -KM         Exercise 13    Exercise Name 13 6\" Lateral Dips  -KM      Cueing 13 Verbal  -KM      Reps 13 25  -KM         Exercise 14    Exercise Name 14 6\" Lateral Step Overs  -KM      Reps 14 25  -KM         Exercise 15    Exercise Name 15 CC: Retro & Lat  -KM      Reps 15 5x each  -KM      Time 15 40#  -KM            User Key  (r) = Recorded By, (t) = Taken By, (c) = Cosigned By    Initials Name Provider Type    Yadi Kiran PTA Physical Therapist Assistant                                                Time Calculation:   Start Time: 0800  Stop Time: 0841  Time Calculation (min): 41 min  Therapy Charges for Today     Code Description Service " Date Service Provider Modifiers Qty    [id number] HC PT THER PROC EA 15 MIN 10/25/2022 Yadi Mcarthur, PTA GP 2                    Yadi Mcarthur PTA  10/25/2022

## 2022-10-26 ENCOUNTER — OFFICE VISIT (OUTPATIENT)
Dept: ORTHOPEDIC SURGERY | Facility: CLINIC | Age: 32
End: 2022-10-26

## 2022-10-26 VITALS — WEIGHT: 173 LBS | HEIGHT: 71 IN | BODY MASS INDEX: 24.22 KG/M2

## 2022-10-26 DIAGNOSIS — M71.161 INFECTION OF RIGHT PREPATELLAR BURSA: Primary | ICD-10-CM

## 2022-10-26 DIAGNOSIS — Z09 STATUS POST ORTHOPEDIC SURGERY, FOLLOW-UP EXAM: ICD-10-CM

## 2022-10-26 PROCEDURE — 99213 OFFICE O/P EST LOW 20 MIN: CPT | Performed by: ORTHOPAEDIC SURGERY

## 2022-10-26 NOTE — PROGRESS NOTES
Subjective: Status post excision of infected right prepatellar bursa     Patient ID: Jonny Arellano is a 32 y.o. male.    Chief Complaint:    History of Present Illness patient is 3 months out is doing well.  Has been wound clinic and has a wound VAC on the knee but states it is just about completely closed.  He is seen in the wound care center once a week and seeing a physician every 2 weeks.  He is gone back to work.  Has completed a physical therapy program.  Still needs to work on strengthening exercises.  He is off pain medication is on no antibiotics       Social History     Occupational History   • Not on file   Tobacco Use   • Smoking status: Every Day     Packs/day: 0.50     Years: 8.00     Pack years: 4.00     Types: Cigarettes   • Smokeless tobacco: Never   • Tobacco comments:     pt has smoked today   Vaping Use   • Vaping Use: Never used   Substance and Sexual Activity   • Alcohol use: Never     Comment: rarely   • Drug use: Defer   • Sexual activity: Yes      Review of Systems      Past Medical History:   Diagnosis Date   • Fracture, foot 2021   • Migraines    • MRSA (methicillin resistant staph aureus) culture positive 01/28/2022    left knee     Past Surgical History:   Procedure Laterality Date   • INCISION AND DRAINAGE LEG Right 07/15/2022    Procedure: PATELLA BURSA EXCISION;  Surgeon: Daniel Chris MD;  Location: [facility];  Service: Orthopedics;  Laterality: Right;   • KNEE SURGERY  07/15/2022   • LACERATION REPAIR Right 1990    index finger     Family History   Problem Relation Age of Onset   • Cancer Mother    • Crohn's disease Mother    • No Known Problems Father    • Malig Hyperthermia Neg Hx          Objective:  There were no vitals filed for this visit.      10/26/22  0828   Weight: 78.5 kg (173 lb)     Body mass index is 24.13 kg/m².        Ortho Exam   He is alert and oriented.  The wound VAC is in place.  The knee itself shows no effusion.  He still has moderate as expected quad  atrophy on the right side compared to the left.  He has 0 to 125 degrees of motion with no instability.  The calf is nontender.  Distal pulses no motor or sensory deficit.     Assessment:        1. Infection of right prepatellar bursa    2. Status post orthopedic surgery, follow-up exam           Plan: He will continue wound care per their instructions.  Continue home exercise strengthening program reviewed with him at length with the strengthening program concentrating on resistive exercises to improve his quad strength.  Again he is gone back to work with no problems.  Return to see me in 4 weeks hopefully for final visit.  Answered all questions            Work Status:    SUE query complete.    Orders:  No orders of the defined types were placed in this encounter.      Medications:  No orders of the defined types were placed in this encounter.      Followup:  Return in about 4 weeks (around 11/23/2022).          Dictated utilizing Dragon dictation   Answers for HPI/ROS submitted by the patient on 10/24/2022  What is the primary reason for your visit?: Other  Please describe your symptoms.: Post op  Have you had these symptoms before?: Yes  How long have you been having these symptoms?: Greater than 2 weeks

## 2022-10-31 ENCOUNTER — APPOINTMENT (OUTPATIENT)
Dept: WOUND CARE | Facility: HOSPITAL | Age: 32
End: 2022-10-31

## 2022-10-31 PROCEDURE — 97607 NEG PRS WND THR NDME<=50SQCM: CPT

## 2022-11-07 ENCOUNTER — APPOINTMENT (OUTPATIENT)
Dept: WOUND CARE | Facility: HOSPITAL | Age: 32
End: 2022-11-07

## 2022-11-09 ENCOUNTER — APPOINTMENT (OUTPATIENT)
Dept: WOUND CARE | Facility: HOSPITAL | Age: 32
End: 2022-11-09

## 2022-11-09 PROCEDURE — 17250 CHEM CAUT OF GRANLTJ TISSUE: CPT

## 2022-11-16 ENCOUNTER — APPOINTMENT (OUTPATIENT)
Dept: WOUND CARE | Facility: HOSPITAL | Age: 32
End: 2022-11-16

## 2022-11-17 ENCOUNTER — APPOINTMENT (OUTPATIENT)
Dept: WOUND CARE | Facility: HOSPITAL | Age: 32
End: 2022-11-17

## 2022-11-17 PROCEDURE — 97607 NEG PRS WND THR NDME<=50SQCM: CPT

## 2022-11-23 ENCOUNTER — APPOINTMENT (OUTPATIENT)
Dept: WOUND CARE | Facility: HOSPITAL | Age: 32
End: 2022-11-23

## 2022-11-23 PROCEDURE — 17250 CHEM CAUT OF GRANLTJ TISSUE: CPT

## 2022-11-30 ENCOUNTER — APPOINTMENT (OUTPATIENT)
Dept: WOUND CARE | Facility: HOSPITAL | Age: 32
End: 2022-11-30

## 2024-01-02 ENCOUNTER — HOSPITAL ENCOUNTER (EMERGENCY)
Facility: HOSPITAL | Age: 34
Discharge: HOME | End: 2024-01-02
Attending: EMERGENCY MEDICINE | Admitting: EMERGENCY MEDICINE
Payer: COMMERCIAL

## 2024-01-02 ENCOUNTER — APPOINTMENT (OUTPATIENT)
Dept: GENERAL RADIOLOGY | Facility: HOSPITAL | Age: 34
End: 2024-01-02
Payer: COMMERCIAL

## 2024-01-02 VITALS
HEART RATE: 92 BPM | BODY MASS INDEX: 24.5 KG/M2 | DIASTOLIC BLOOD PRESSURE: 81 MMHG | RESPIRATION RATE: 16 BRPM | SYSTOLIC BLOOD PRESSURE: 131 MMHG | TEMPERATURE: 97.6 F | OXYGEN SATURATION: 97 % | HEIGHT: 71 IN | WEIGHT: 175 LBS

## 2024-01-02 DIAGNOSIS — S61.212A LACERATION OF RIGHT MIDDLE FINGER WITHOUT FOREIGN BODY WITHOUT DAMAGE TO NAIL, INITIAL ENCOUNTER: Primary | ICD-10-CM

## 2024-01-02 PROCEDURE — 73130 X-RAY EXAM OF HAND: CPT

## 2024-01-02 PROCEDURE — 99282 EMERGENCY DEPT VISIT SF MDM: CPT

## 2024-01-02 RX ORDER — DOXYCYCLINE 100 MG/1
100 CAPSULE ORAL 2 TIMES DAILY
Qty: 14 CAPSULE | Refills: 0 | Status: SHIPPED | OUTPATIENT
Start: 2024-01-02 | End: 2024-01-09

## 2024-01-02 RX ORDER — LIDOCAINE HYDROCHLORIDE 20 MG/ML
5 INJECTION, SOLUTION INFILTRATION; PERINEURAL ONCE
Status: COMPLETED | OUTPATIENT
Start: 2024-01-02 | End: 2024-01-02

## 2024-01-02 RX ADMIN — LIDOCAINE HYDROCHLORIDE 5 ML: 20 INJECTION, SOLUTION INFILTRATION; PERINEURAL at 12:20

## 2024-01-02 NOTE — ED PROVIDER NOTES
Subjective   History of Present Illness  Patient is a 33-year-old male who presents to the emergency department for right middle finger laceration that occurred immediately prior to arrival.  Patient was climbing up onto his truck bed when he sliced his finger onto a piece of metal that was sticking out.  Patient thinks the wound went deep.  Reports normal range of motion and sensation.  Bleeding largely controlled.  Up-to-date on tetanus.        Review of Systems   Constitutional:  Negative for appetite change, chills, diaphoresis, fatigue and fever.   Skin:  Positive for wound. Negative for color change, pallor and rash.   Neurological:  Negative for weakness and numbness.       Past Medical History:   Diagnosis Date    Fracture, foot 2021    Migraines     MRSA (methicillin resistant staph aureus) culture positive 01/28/2022    left knee       No Known Allergies    Past Surgical History:   Procedure Laterality Date    INCISION AND DRAINAGE LEG Right 07/15/2022    Procedure: PATELLA BURSA EXCISION;  Surgeon: Daniel Chris MD;  Location: [facility];  Service: Orthopedics;  Laterality: Right;    KNEE SURGERY  07/15/2022    LACERATION REPAIR Right 1990    index finger       Family History   Problem Relation Age of Onset    Cancer Mother     Crohn's disease Mother     No Known Problems Father     Malig Hyperthermia Neg Hx        Social History     Socioeconomic History    Marital status: Single   Tobacco Use    Smoking status: Every Day     Packs/day: 0.50     Years: 8.00     Additional pack years: 0.00     Total pack years: 4.00     Types: Cigarettes    Smokeless tobacco: Never    Tobacco comments:     pt has smoked today   Vaping Use    Vaping Use: Never used   Substance and Sexual Activity    Alcohol use: Never     Comment: rarely    Drug use: Defer    Sexual activity: Yes           Objective   Physical Exam  Constitutional:       General: He is not in acute distress.     Appearance: He is normal weight. He is not  ill-appearing, toxic-appearing or diaphoretic.   Musculoskeletal:         General: Swelling present. No tenderness, deformity or signs of injury. Normal range of motion.      Right lower leg: No edema.      Left lower leg: No edema.   Skin:     General: Skin is warm and dry.      Findings: Lesion present. No erythema or rash.      Comments: 1.5 cm laceration to palmar surface of right middle finger between MCP and PIP joint.  No visible underlying bone, tendon, nerve.  Distal sensation and capillary refill intact.  Bleeding controlled.   Neurological:      Mental Status: He is alert.         Laceration Repair    Date/Time: 1/2/2024 12:37 PM    Performed by: Ines Silva PA-C  Authorized by: Sriram Alvarez MD    Consent:     Consent obtained:  Verbal    Consent given by:  Patient    Risks discussed:  Infection, pain, poor cosmetic result, poor wound healing, retained foreign body and tendon damage  Laceration details:     Location:  Finger    Finger location:  R long finger    Length (cm):  1.5  Pre-procedure details:     Preparation:  Patient was prepped and draped in usual sterile fashion  Exploration:     Imaging obtained: x-ray      Imaging outcome: foreign body noted    Treatment:     Area cleansed with:  Povidone-iodine, chlorhexidine, saline and soap and water    Amount of cleaning:  Extensive    Irrigation solution:  Sterile saline    Irrigation method:  Pressure wash  Skin repair:     Repair method:  Sutures    Suture size:  5-0    Suture material:  Nylon    Suture technique:  Simple interrupted    Number of sutures:  2  Approximation:     Approximation:  Close  Repair type:     Repair type:  Simple  Post-procedure details:     Dressing:  Open (no dressing)    Procedure completion:  Tolerated well, no immediate complications             ED Course  ED Course as of 01/02/24 1322   Tue Jan 02, 2024   1208 Negative x-ray. [AS]      ED Course User Index  [AS] Ines Silva PA-C                                              Medical Decision Making  Patient is a 33-year-old male who presents emergency department for finger laceration.  X-rays are negative.  Neurovascularly intact.  Range of motion intact.  Patient reports wound is deep.  No evidence of fracture or tendon laceration.  No evidence of foreign body.  Will prescribe antibiotics.  Discussed when to return to the emergency department.  Answered all questions.  Patient verbalized understanding and was agreeable to plan and discharge.    My differential diagnosis includes but is not limited to: Fracture, dislocation, sprain, strain, tendon laceration, flexor tenosynovitis, foreign body    Problems Addressed:  Laceration of right middle finger without foreign body without damage to nail, initial encounter: complicated acute illness or injury    Amount and/or Complexity of Data Reviewed  Radiology: ordered.    Risk  Prescription drug management.        Final diagnoses:   Laceration of right middle finger without foreign body without damage to nail, initial encounter       ED Disposition  ED Disposition       ED Disposition   Discharge    Condition   Stable    Comment   --               Provider, No Known  [facility] [address] [phone]    Schedule an appointment as soon as possible for a visit            Medication List        New Prescriptions      doxycycline 100 MG capsule  Commonly known as: MONODOX  Take 1 capsule by mouth 2 (Two) Times a Day for 7 days.               Where to Get Your Medications        These medications were sent to [id number] - [place] - [address] - [phone]  - [phone]   [address] [facility] [place] [address]      Phone: [phone]   doxycycline 100 MG capsule            Ines Silva PA-C  01/02/24 6786

## (undated) DEVICE — SUT ETHLN 3/0 PSL BLK MONO SA 30IN 1691H

## (undated) DEVICE — BOWL UTIL STRL 32OZ

## (undated) DEVICE — GLV SURG NEOLON 2G PF LF 8.5 STRL

## (undated) DEVICE — BNDG ELAS ELITE V/CLOSE 6IN 5YD LF STRL

## (undated) DEVICE — PREMIUM WET SKIN PREP TRAY: Brand: MEDLINE INDUSTRIES, INC.

## (undated) DEVICE — PROXIMATE RH ROTATING HEAD SKIN STAPLERS (35 WIDE) CONTAINS 35 STAINLESS STEEL STAPLES: Brand: PROXIMATE

## (undated) DEVICE — SPNG GZ WOVN 4X4IN 12PLY 10/BX STRL

## (undated) DEVICE — STCKNT IMPERV 14IN STRL

## (undated) DEVICE — IRRIGATOR BULB ASEPTO 60CC STRL

## (undated) DEVICE — DRSNG PAD ABD 8X10IN STRL

## (undated) DEVICE — Device

## (undated) DEVICE — TRAP FLD MINIVAC MEGADYNE 100ML

## (undated) DEVICE — BNDG ELAS MATRX V/CLS 4IN 5YD LF

## (undated) DEVICE — DRN PENRS SIL 1/4X18IN LF STRL

## (undated) DEVICE — PK BASIC ORTHO 90

## (undated) DEVICE — DISPOSABLE TOURNIQUET CUFF SINGLE BLADDER, SINGLE PORT AND QUICK CONNECT CONNECTOR: Brand: COLOR CUFF

## (undated) DEVICE — DRSNG ADAPTIC 3X8

## (undated) DEVICE — GLV SURG SENSICARE ORTHO PF LF 8 STRL

## (undated) DEVICE — TOWEL,OR,DSP,ST,BLUE,STD,4/PK,20PK/CS: Brand: MEDLINE

## (undated) DEVICE — WRAP KNEE COLD THERAPY

## (undated) DEVICE — IMMOB KN 3PNL DLX CANVS 24IN BLU

## (undated) DEVICE — 450 ML BOTTLE OF 0.05% CHLORHEXIDINE GLUCONATE IN 99.95% STERILE WATER FOR IRRIGATION, USP AND APPLICATOR.: Brand: IRRISEPT ANTIMICROBIAL WOUND LAVAGE

## (undated) DEVICE — CULT AER/ANAEROB FASTIDIOUS BACT

## (undated) DEVICE — 3M™ IOBAN™ 2 ANTIMICROBIAL INCISE DRAPE 6640EZ: Brand: IOBAN™ 2

## (undated) DEVICE — PATIENT RETURN ELECTRODE, SINGLE-USE, CONTACT QUALITY MONITORING, ADULT, WITH 9FT CORD, FOR PATIENTS WEIGING OVER 33LBS. (15KG): Brand: MEGADYNE

## (undated) DEVICE — TRANSPOSAL ULTRAFLEX DUO/QUAD ULTRA CART MANIFOLD

## (undated) DEVICE — SPNG LAP 18X18IN LF STRL PK/5

## (undated) DEVICE — DRAPE,HIP,W/POUCHES,STERILE: Brand: MEDLINE

## (undated) DEVICE — TBG PENCL TELESCP MEGADYNE SMOKE EVAC 10FT

## (undated) DEVICE — GLV SURG SIGNATURE ESSENTIAL PF LTX SZ8

## (undated) DEVICE — DECANT BG O JET

## (undated) DEVICE — SUT VIC 2/0 CP2 CR8 18IN J762D